# Patient Record
Sex: MALE | Race: WHITE | Employment: OTHER | URBAN - METROPOLITAN AREA
[De-identification: names, ages, dates, MRNs, and addresses within clinical notes are randomized per-mention and may not be internally consistent; named-entity substitution may affect disease eponyms.]

---

## 2018-08-25 ENCOUNTER — HOSPITAL ENCOUNTER (INPATIENT)
Age: 83
LOS: 3 days | Discharge: HOME | DRG: 244 | End: 2018-08-28
Attending: EMERGENCY MEDICINE | Admitting: INTERNAL MEDICINE
Payer: MEDICARE

## 2018-08-25 DIAGNOSIS — R00.1 SYMPTOMATIC BRADYCARDIA: Primary | ICD-10-CM

## 2018-08-25 DIAGNOSIS — R55 SYNCOPE AND COLLAPSE: ICD-10-CM

## 2018-08-25 PROBLEM — I45.10 RIGHT BUNDLE BRANCH BLOCK: Status: ACTIVE | Noted: 2018-08-25

## 2018-08-25 PROBLEM — R03.0 ELEVATED BLOOD PRESSURE READING WITHOUT DIAGNOSIS OF HYPERTENSION: Status: ACTIVE | Noted: 2018-08-25

## 2018-08-25 LAB
ALBUMIN SERPL-MCNC: 3.7 G/DL (ref 3.5–5)
ALBUMIN/GLOB SERPL: 1.1 {RATIO} (ref 1.1–2.2)
ALP SERPL-CCNC: 59 U/L (ref 45–117)
ALT SERPL-CCNC: 21 U/L (ref 12–78)
ANION GAP SERPL CALC-SCNC: 9 MMOL/L (ref 5–15)
APPEARANCE UR: CLEAR
AST SERPL-CCNC: 22 U/L (ref 15–37)
BACTERIA URNS QL MICRO: NEGATIVE /HPF
BASOPHILS # BLD: 0.1 K/UL (ref 0–0.1)
BASOPHILS NFR BLD: 1 % (ref 0–1)
BILIRUB SERPL-MCNC: 1.3 MG/DL (ref 0.2–1)
BILIRUB UR QL: NEGATIVE
BUN SERPL-MCNC: 15 MG/DL (ref 6–20)
BUN/CREAT SERPL: 13 (ref 12–20)
CALCIUM SERPL-MCNC: 8.6 MG/DL (ref 8.5–10.1)
CHLORIDE SERPL-SCNC: 104 MMOL/L (ref 97–108)
CO2 SERPL-SCNC: 28 MMOL/L (ref 21–32)
COLOR UR: NORMAL
COMMENT, HOLDF: NORMAL
CREAT SERPL-MCNC: 1.14 MG/DL (ref 0.7–1.3)
DIFFERENTIAL METHOD BLD: ABNORMAL
EOSINOPHIL # BLD: 0.1 K/UL (ref 0–0.4)
EOSINOPHIL NFR BLD: 1 % (ref 0–7)
EPITH CASTS URNS QL MICRO: NORMAL /LPF
ERYTHROCYTE [DISTWIDTH] IN BLOOD BY AUTOMATED COUNT: 12.9 % (ref 11.5–14.5)
GLOBULIN SER CALC-MCNC: 3.5 G/DL (ref 2–4)
GLUCOSE SERPL-MCNC: 145 MG/DL (ref 65–100)
GLUCOSE UR STRIP.AUTO-MCNC: NEGATIVE MG/DL
HCT VFR BLD AUTO: 40.1 % (ref 36.6–50.3)
HGB BLD-MCNC: 13.4 G/DL (ref 12.1–17)
HGB UR QL STRIP: NEGATIVE
HYALINE CASTS URNS QL MICRO: NORMAL /LPF (ref 0–5)
IMM GRANULOCYTES # BLD: 0 K/UL (ref 0–0.04)
IMM GRANULOCYTES NFR BLD AUTO: 0 % (ref 0–0.5)
KETONES UR QL STRIP.AUTO: NEGATIVE MG/DL
LEUKOCYTE ESTERASE UR QL STRIP.AUTO: NEGATIVE
LYMPHOCYTES # BLD: 1.1 K/UL (ref 0.8–3.5)
LYMPHOCYTES NFR BLD: 13 % (ref 12–49)
MAGNESIUM SERPL-MCNC: 2.2 MG/DL (ref 1.6–2.4)
MCH RBC QN AUTO: 32.1 PG (ref 26–34)
MCHC RBC AUTO-ENTMCNC: 33.4 G/DL (ref 30–36.5)
MCV RBC AUTO: 95.9 FL (ref 80–99)
MONOCYTES # BLD: 0.5 K/UL (ref 0–1)
MONOCYTES NFR BLD: 6 % (ref 5–13)
NEUTS SEG # BLD: 6.4 K/UL (ref 1.8–8)
NEUTS SEG NFR BLD: 79 % (ref 32–75)
NITRITE UR QL STRIP.AUTO: NEGATIVE
NRBC # BLD: 0 K/UL (ref 0–0.01)
NRBC BLD-RTO: 0 PER 100 WBC
PH UR STRIP: 6.5 [PH] (ref 5–8)
PLATELET # BLD AUTO: 209 K/UL (ref 150–400)
PMV BLD AUTO: 9.7 FL (ref 8.9–12.9)
POTASSIUM SERPL-SCNC: 4 MMOL/L (ref 3.5–5.1)
PROT SERPL-MCNC: 7.2 G/DL (ref 6.4–8.2)
PROT UR STRIP-MCNC: NEGATIVE MG/DL
RBC # BLD AUTO: 4.18 M/UL (ref 4.1–5.7)
RBC #/AREA URNS HPF: NORMAL /HPF (ref 0–5)
SAMPLES BEING HELD,HOLD: NORMAL
SODIUM SERPL-SCNC: 141 MMOL/L (ref 136–145)
SP GR UR REFRACTOMETRY: 1.01 (ref 1–1.03)
TROPONIN I SERPL-MCNC: <0.05 NG/ML
TSH SERPL DL<=0.05 MIU/L-ACNC: 4.35 UIU/ML (ref 0.36–3.74)
UR CULT HOLD, URHOLD: NORMAL
UROBILINOGEN UR QL STRIP.AUTO: 0.2 EU/DL (ref 0.2–1)
WBC # BLD AUTO: 8.1 K/UL (ref 4.1–11.1)
WBC URNS QL MICRO: NORMAL /HPF (ref 0–4)

## 2018-08-25 PROCEDURE — 99285 EMERGENCY DEPT VISIT HI MDM: CPT

## 2018-08-25 PROCEDURE — 36415 COLL VENOUS BLD VENIPUNCTURE: CPT | Performed by: EMERGENCY MEDICINE

## 2018-08-25 PROCEDURE — 93005 ELECTROCARDIOGRAM TRACING: CPT

## 2018-08-25 PROCEDURE — 83735 ASSAY OF MAGNESIUM: CPT | Performed by: EMERGENCY MEDICINE

## 2018-08-25 PROCEDURE — 81001 URINALYSIS AUTO W/SCOPE: CPT | Performed by: EMERGENCY MEDICINE

## 2018-08-25 PROCEDURE — 84443 ASSAY THYROID STIM HORMONE: CPT | Performed by: INTERNAL MEDICINE

## 2018-08-25 PROCEDURE — 84484 ASSAY OF TROPONIN QUANT: CPT | Performed by: EMERGENCY MEDICINE

## 2018-08-25 PROCEDURE — 74011000250 HC RX REV CODE- 250: Performed by: INTERNAL MEDICINE

## 2018-08-25 PROCEDURE — 80053 COMPREHEN METABOLIC PANEL: CPT | Performed by: EMERGENCY MEDICINE

## 2018-08-25 PROCEDURE — 65610000006 HC RM INTENSIVE CARE

## 2018-08-25 PROCEDURE — 77030018729 HC ELECTRD DEFIB PAD CARD -B

## 2018-08-25 PROCEDURE — 85025 COMPLETE CBC W/AUTO DIFF WBC: CPT | Performed by: EMERGENCY MEDICINE

## 2018-08-25 PROCEDURE — 74011250636 HC RX REV CODE- 250/636: Performed by: INTERNAL MEDICINE

## 2018-08-25 RX ORDER — LATANOPROST 50 UG/ML
1 SOLUTION/ DROPS OPHTHALMIC
COMMUNITY

## 2018-08-25 RX ORDER — SODIUM CHLORIDE 0.9 % (FLUSH) 0.9 %
5-10 SYRINGE (ML) INJECTION AS NEEDED
Status: DISCONTINUED | OUTPATIENT
Start: 2018-08-25 | End: 2018-08-28 | Stop reason: HOSPADM

## 2018-08-25 RX ORDER — HYDRALAZINE HYDROCHLORIDE 20 MG/ML
10 INJECTION INTRAMUSCULAR; INTRAVENOUS
Status: DISCONTINUED | OUTPATIENT
Start: 2018-08-25 | End: 2018-08-26

## 2018-08-25 RX ORDER — SODIUM CHLORIDE 0.9 % (FLUSH) 0.9 %
5-10 SYRINGE (ML) INJECTION EVERY 8 HOURS
Status: DISCONTINUED | OUTPATIENT
Start: 2018-08-25 | End: 2018-08-28 | Stop reason: HOSPADM

## 2018-08-25 RX ORDER — LATANOPROST 50 UG/ML
1 SOLUTION/ DROPS OPHTHALMIC
Status: DISCONTINUED | OUTPATIENT
Start: 2018-08-25 | End: 2018-08-28 | Stop reason: HOSPADM

## 2018-08-25 RX ORDER — ACETAMINOPHEN 325 MG/1
650 TABLET ORAL
Status: DISCONTINUED | OUTPATIENT
Start: 2018-08-25 | End: 2018-08-28 | Stop reason: HOSPADM

## 2018-08-25 RX ORDER — ENOXAPARIN SODIUM 100 MG/ML
40 INJECTION SUBCUTANEOUS EVERY 24 HOURS
Status: DISCONTINUED | OUTPATIENT
Start: 2018-08-25 | End: 2018-08-28 | Stop reason: HOSPADM

## 2018-08-25 RX ORDER — ATROPINE SULFATE 0.1 MG/ML
1 INJECTION INTRAVENOUS AS NEEDED
Status: DISCONTINUED | OUTPATIENT
Start: 2018-08-25 | End: 2018-08-28 | Stop reason: HOSPADM

## 2018-08-25 RX ADMIN — Medication 10 ML: at 22:00

## 2018-08-25 RX ADMIN — ENOXAPARIN SODIUM 40 MG: 40 INJECTION, SOLUTION INTRAVENOUS; SUBCUTANEOUS at 16:20

## 2018-08-25 RX ADMIN — LATANOPROST 1 DROP: 50 SOLUTION OPHTHALMIC at 22:11

## 2018-08-25 NOTE — ED NOTES
TRANSFER - OUT REPORT:    Verbal report given to Amy  on Josh Alvarez  being transferred to [phone] for routine progression of care       Report consisted of patients Situation, Background, Assessment and   Recommendations(SBAR). Information from the following report(s) SBAR, ED Summary, [facility] and Recent Results was reviewed with the receiving nurse. Opportunity for questions and clarification was provided.

## 2018-08-25 NOTE — IP AVS SNAPSHOT
[address] 
[phone] Patient: Teri Vargas MRN: [id number] :1929 A check orion indicates which time of day the medication should be taken. My Medications START taking these medications Instructions Each Dose to Equal  
 Morning Noon Evening Bedtime  
 cephALEXin 500 mg capsule Commonly known as:  Daja Officer Your last dose was: Your next dose is: Take 1 Cap by mouth three (3) times daily for 5 days. 500 mg CONTINUE taking these medications Instructions Each Dose to Equal  
 Morning Noon Evening Bedtime  
 latanoprost 0.005 % ophthalmic solution Commonly known as:  Verhanna Saavedra Your last dose was: Your next dose is:    
   
   
 Administer 1 Drop to both eyes nightly. 1 Drop Where to Get Your Medications These medications were sent to [facility], [address] [place] [address], [address] [place] [address] Phone:  [phone]  
  cephALEXin 500 mg capsule

## 2018-08-25 NOTE — H&P
[facility]    Hospitalist Admission Note      NAME:  Alex Adame   :   1929   MRN:  [id number]     PCP:  None     Date/Time:  2018 3:08 PM          Subjective:     CHIEF COMPLAINT: Syncope     HISTORY OF PRESENT ILLNESS:     Mr. Gamal Dobbs is a 80 y.o.  male with no active medical issues who presented to the Emergency Department complaining of syncope. Patient was in usual state of health this AM, mowed lawn with push mower and came inside for lunch. While eating lunch, patient had syncope/LOC with near immediate regain of consciousness. Wife witnessed, noted that he flailed and didn't know what happened. 2 more episodes occurred since 1 pm. Brought in by EMS and noted to have wildly variable HR and patient having dizziness. In ED, patient had several missed beats with near LOC again. We will admit in concert with our cardiology colleagues.     Past Medical Hx:  - Denies any current medical conditions    Past Surgical Hx:  - Appendectomy at 25 yo    Family Hx:  - No hx of early cardiovascular disease    Social Hx:  - Doesn't drink or smoke      No Known Allergies     Prior to Admission medications    Not on File   - Take tumeric, garlic, D3, fish oil    Review of Systems:  (bold if positive, if negative)    Gen:  Eyes:  ENT:  CVS:  syncopePulm:  GI:    :    MS:  Skin:  Psych:  Endo:    Hem:  Renal:    Neuro:        Objective:      VITALS:    Vital signs reviewed; most recent are:    Visit Vitals    /52 (BP 1 Location: Left arm, BP Patient Position: At rest)    Pulse 97    Temp 97.9 °F (36.6 °C)    Resp 11    Ht 5' 8.5\" (1.74 m)    Wt 68 kg (150 lb)    SpO2 98%    BMI 22.48 kg/m2     SpO2 Readings from Last 6 Encounters:   18 98%        No intake or output data in the 24 hours ending 18 1508     Exam:     Physical Exam:    Gen:  Well-developed, well-nourished, in no acute distress  HEENT:  Pink conjunctivae, PERRL, hearing intact to voice, moist mucous membranes  Neck:  Supple, without masses, thyroid non-tender  Resp:  No accessory muscle use, clear breath sounds without wheezes rales or rhonchi  Card:  Irregularly, irregular No murmurs, normal S1, S2 without thrills, bruits or peripheral edema  Abd:  Soft, non-tender, non-distended, normoactive bowel sounds are present, no palpable organomegaly and no detectable hernias  Lymph:  No cervical or inguinal adenopathy  Musc:  No cyanosis or clubbing  Skin:  No rashes or ulcers, skin turgor is good  Neuro:  Cranial nerves are grossly intact, no focal motor weakness, follows commands appropriately  Psych:  Good insight, oriented to person, place and time, alert     Labs:    Recent Labs      08/25/18   1418   WBC  8.1   HGB  13.4   HCT  40.1   PLT  209     Recent Labs      08/25/18   1418   NA  141   K  4.0   CL  104   CO2  28   GLU  145*   BUN  15   CREA  1.14   CA  8.6   MG  2.2   ALB  3.7   TBILI  1.3*   SGOT  22   ALT  21     No results found for: GLUCPOC  No results for input(s): PH, PCO2, PO2, HCO3, FIO2 in the last 72 hours. No results for input(s): INR in the last 72 hours. No lab exists for component: INREXT  All Micro Results     Procedure Component Value Units Date/Time    URINE CULTURE HOLD SAMPLE [[id number]] Collected:  08/25/18 1418    Order Status:  Completed Specimen:  Urine from Serum Updated:  08/25/18 1428     Urine culture hold         URINE ON HOLD IN MICROBIOLOGY DEPT FOR 3 DAYS. IF UNPRESERVED URINE IS SUBMITTED, IT CANNOT BE USED FOR ADDITIONAL TESTING AFTER 24 HRS, RECOLLECTION WILL BE REQUIRED. ECG and telemetry strips reviewed    I have reviewed previous records       Assessment and Plan: Active Problems:  Symptomatic bradycardia. 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 Admit to ICU for close monitoring. Transcutaneous pacer pads in place. PRN atropine.  Cardiology consult. Likely needs PPM. Check echo. Cycle enzymes. Elevated blood pressure reading without diagnosis of hypertension. Wide pulse pressure. No chest pain or SOB. PRN hydralazine. Avoid AV janki blocking agents. Telemetry reviewed:   Irregular rhythm, appears sinus at times, tachy-emmanuel? Mobitz 2?     Risk of deterioration: high      Total CRITICAL CARE time spent with patient: [phone] [facility] discussed with: Patient, Family, Nursing Staff and >50% of time spent in counseling and coordination of care    Discussed:  Code Status, Care Plan and D/C Planning       ___________________________________________________    Attending Physician: Julius Cranker, DO

## 2018-08-25 NOTE — ED PROVIDER NOTES
HPI Comments: 80 y.o. male with no significant past medical history who presents from home with chief complaint of syncope. Pt reports he had a syncopal episode which lasted several seconds while eating lunch after mowing grass and showering 1-2 hours ago. Per Pt's wife, Pt's \"arms were flailing around\" at that time. Pt states he had a 2nd episode which lasted several seconds after sitting on his couch. Pt's wife noted Pt had 3 syncopal episodes total. Pt now c/o lightheadedness. Pt states he does not go to the doctor regularly and has never been on regular medications. NKDA. When asked about hx of surgery, Pt notes he had an appendectomy at 24 y.o. Pt denies prior hx of syncope, DM, HTN, or high cholesterol. Pt denies pain, vomiting, and diarrhea. There are no other acute medical concerns at this time. Old Chart Review: No prior visits. Social hx: denies smoking and EtOH use    Note written by Deangelo Roy, as dictated by Boogie Horowitz DO 2:38 PM    The history is provided by the patient. No past medical history on file. No past surgical history on file. No family history on file. Social History     Social History    Marital status: N/A     Spouse name: N/A    Number of children: N/A    Years of education: N/A     Occupational History    Not on file. Social History Main Topics    Smoking status: Not on file    Smokeless tobacco: Not on file    Alcohol use Not on file    Drug use: Not on file    Sexual activity: Not on file     Other Topics Concern    Not on file     Social History Narrative         ALLERGIES: Review of patient's allergies indicates no known allergies. Review of Systems   Cardiovascular: Negative for chest pain. Gastrointestinal: Negative for abdominal pain, diarrhea and vomiting. Musculoskeletal: Negative for back pain. Neurological: Positive for syncope and light-headedness. Negative for headaches.    All other systems reviewed and are negative. Vitals:    08/25/18 1352   BP: 165/52   Pulse: 97   Resp: 11   Temp: 97.9 °F (36.6 °C)   SpO2: 98%   Weight: 68 kg (150 lb)   Height: 5' 8.5\" (1.74 m)            Physical Exam      Constitutional: Pt is awake and alert. Pt appears well-developed and well-nourished. NAD. HENT:   Head: Normocephalic and atraumatic. Nose: Nose normal.   Mouth/Throat: Oropharynx is clear and moist. No oropharyngeal exudate. Eyes: Conjunctivae and extraocular motions are normal. Pupils are equal, round, and reactive to light. Right eye exhibits no discharge. Left eye exhibits no discharge. No scleral icterus. Neck: No tracheal deviation present. Supple neck. Cardiovascular: Normal rate, regular rhythm, normal heart sounds and intact distal pulses. Exam reveals no gallop and no friction rub. No murmur heard. Pulmonary/Chest: Effort normal and breath sounds normal.  Pt  has no wheezes. Pt  has no rales. Abdominal: Soft. Pt  exhibits no distension and no mass. No tenderness. Pt  has no rebound and no guarding. Musculoskeletal:  Pt  exhibits no edema and no tenderness. Ext: Normal ROM in all four extremities; not tender to palpation; distal pulses are normal, no edema. Neurological:  Pt is alert. nonfocal neuro exam.  Skin: Skin is warm and dry. Pt  is not diaphoretic. Psychiatric:  Pt  has a normal mood and affect. Behavior is normal.   Note written by Deangelo Mosley, as dictated by Shauna Salamanca DO 2:38 PM    MDM  Number of Diagnoses or Management Options  Critical Care  Total time providing critical care: 30-74 minutes      30 minutes      ED Course       Procedures    ED EKG interpretation:  Rhythm: sinus tachycardia w/ PAC's; Rate (approx.): 102;  Axis: normal; ST/T wave: no acute ST/T wave changes noted; RBBB  Note written by Deangelo Mosley, as dictated by Shauna Salamanca DO 2:02 PM    PROGRESS NOTE:  2:38 PM  Pt's heart monitor showed HR changing back and forth from sinus bradycardia and NSR (rate 50's-100's) w/ multiple dropped QRS complexes w/ visible P waves and PVC's. PROGRESS NOTE:  2:41 PM  Heart monitor stripes were printed and scanned into Pt's chart. CONSULT NOTE:  2:47 PM Isidro Gaines DO spoke with Dr. Cedrick Dwyer, Consult for Cardiology. Discussed available diagnostic tests and clinical findings. Dr. Cedrikc Dwyer recommends admission via hospitalist service and he will follow as a consultant. CONSULT NOTE:  2:49 PM Isidro Gaines DO spoke with Dr. Les Camejo, Consult for Hospitalist.  Discussed available diagnostic tests and clinical findings. Dr. Les Camejo will admit Pt. While interviewing pt, he had several pauses and episodes of mild bradycardia  Ordered prn atropine and attached pt to external pacer  Admit hospitalist  Cards on board  Takes no regular meds  D/w pt and wife. Recent Results (from the past 12 hour(s))   EKG, 12 LEAD, INITIAL    Collection Time: 08/25/18  1:54 PM   Result Value Ref Range    Ventricular Rate 102 BPM    Atrial Rate 102 BPM    P-R Interval 158 ms    QRS Duration 132 ms    Q-T Interval 390 ms    QTC Calculation (Bezet) 508 ms    Calculated P Axis 74 degrees    Calculated R Axis 67 degrees    Calculated T Axis 61 degrees    Diagnosis       Sinus tachycardia with premature atrial complexes  Right bundle branch block  Abnormal ECG  No previous ECGs available     SAMPLES BEING HELD    Collection Time: 08/25/18  2:18 PM   Result Value Ref Range    SAMPLES BEING HELD RED,BLUE,SST     COMMENT        Add-on orders for these samples will be processed based on acceptable specimen integrity and analyte stability, which may vary by analyte.    MAGNESIUM    Collection Time: 08/25/18  2:18 PM   Result Value Ref Range    Magnesium 2.2 1.6 - 2.4 mg/dL   CBC WITH AUTOMATED DIFF    Collection Time: 08/25/18  2:18 PM   Result Value Ref Range    WBC 8.1 4.1 - 11.1 K/uL    RBC 4.18 4.10 - 5.70 M/uL    HGB 13.4 12.1 - 17.0 g/dL    HCT 40.1 36.6 - 50.3 %    MCV 95.9 80.0 - 99.0 FL    MCH 32.1 26.0 - 34.0 PG    MCHC 33.4 30.0 - 36.5 g/dL    RDW 12.9 11.5 - 14.5 %    PLATELET 936 458 - 176 K/uL    MPV 9.7 8.9 - 12.9 FL    NRBC 0.0 0  WBC    ABSOLUTE NRBC 0.00 0.00 - 0.01 K/uL    NEUTROPHILS 79 (H) 32 - 75 %    LYMPHOCYTES 13 12 - 49 %    MONOCYTES 6 5 - 13 %    EOSINOPHILS 1 0 - 7 %    BASOPHILS 1 0 - 1 %    IMMATURE GRANULOCYTES 0 0.0 - 0.5 %    ABS. NEUTROPHILS 6.4 1.8 - 8.0 K/UL    ABS. LYMPHOCYTES 1.1 0.8 - 3.5 K/UL    ABS. MONOCYTES 0.5 0.0 - 1.0 K/UL    ABS. EOSINOPHILS 0.1 0.0 - 0.4 K/UL    ABS. BASOPHILS 0.1 0.0 - 0.1 K/UL    ABS. IMM. GRANS. 0.0 0.00 - 0.04 K/UL    DF AUTOMATED     METABOLIC PANEL, COMPREHENSIVE    Collection Time: 08/25/18  2:18 PM   Result Value Ref Range    Sodium 141 136 - 145 mmol/L    Potassium 4.0 3.5 - 5.1 mmol/L    Chloride 104 97 - 108 mmol/L    CO2 28 21 - 32 mmol/L    Anion gap 9 5 - 15 mmol/L    Glucose 145 (H) 65 - 100 mg/dL    BUN 15 6 - 20 MG/DL    Creatinine 1.14 0.70 - 1.30 MG/DL    BUN/Creatinine ratio 13 12 - 20      GFR est AA >60 >60 ml/min/1.73m2    GFR est non-AA >60 >60 ml/min/1.73m2    Calcium 8.6 8.5 - 10.1 MG/DL    Bilirubin, total 1.3 (H) 0.2 - 1.0 MG/DL    ALT (SGPT) 21 12 - 78 U/L    AST (SGOT) 22 15 - 37 U/L    Alk.  phosphatase 59 45 - 117 U/L    Protein, total 7.2 6.4 - 8.2 g/dL    Albumin 3.7 3.5 - 5.0 g/dL    Globulin 3.5 2.0 - 4.0 g/dL    A-G Ratio 1.1 1.1 - 2.2     TROPONIN I    Collection Time: 08/25/18  2:18 PM   Result Value Ref Range    Troponin-I, Qt. <0.05 <0.05 ng/mL   URINALYSIS W/MICROSCOPIC    Collection Time: 08/25/18  2:18 PM   Result Value Ref Range    Color YELLOW/STRAW      Appearance CLEAR CLEAR      Specific gravity 1.012 1.003 - 1.030      pH (UA) 6.5 5.0 - 8.0      Protein NEGATIVE  NEG mg/dL    Glucose NEGATIVE  NEG mg/dL    Ketone NEGATIVE  NEG mg/dL    Bilirubin NEGATIVE  NEG      Blood NEGATIVE  NEG      Urobilinogen 0.2 0.2 - 1.0 EU/dL    Nitrites NEGATIVE  NEG Leukocyte Esterase NEGATIVE  NEG      WBC 0-4 0 - 4 /hpf    RBC 0-5 0 - 5 /hpf    Epithelial cells FEW FEW /lpf    Bacteria NEGATIVE  NEG /hpf    Hyaline cast 0-2 0 - 5 /lpf   URINE CULTURE HOLD SAMPLE    Collection Time: 08/25/18  2:18 PM   Result Value Ref Range    Urine culture hold        URINE ON HOLD IN MICROBIOLOGY DEPT FOR 3 DAYS. IF UNPRESERVED URINE IS SUBMITTED, IT CANNOT BE USED FOR ADDITIONAL TESTING AFTER 24 HRS, RECOLLECTION WILL BE REQUIRED.    TSH 3RD GENERATION    Collection Time: 08/25/18  2:18 PM   Result Value Ref Range    TSH 4.35 (H) 0.36 - 3.74 uIU/mL

## 2018-08-25 NOTE — IP AVS SNAPSHOT
[address] [facility] [address] 
 
 
 [address] [facility] 
[phone] Patient: Tim Fuller MRN: [id number] :[id number] About your hospitalization You were admitted on:  2018 You last received care in the:  [facility] 3 INTENSIVE CARE You were discharged on:  2018 Why you were hospitalized Your primary diagnosis was:  Syncope And Collapse Your diagnoses also included:  Symptomatic Bradycardia, Elevated Blood Pressure Reading Without Diagnosis Of Hypertension, Right Bundle Branch Block Follow-up Information Follow up With Details Comments Contact Info Lisa Barclay MD On 2018 Hospital PCP f/u appointment on  @ 1:00 p.m. [address] [facility] [address] [place] [address] 
[phone] None   None (395) Patient stated that they have no PCP Discharge Orders None A check orion indicates which time of day the medication should be taken. My Medications START taking these medications Instructions Each Dose to Equal  
 Morning Noon Evening Bedtime  
 cephALEXin 500 mg capsule Commonly known as:  Timmy Corrales Your last dose was: Your next dose is: Take 1 Cap by mouth three (3) times daily for 5 days. 500 mg CONTINUE taking these medications Instructions Each Dose to Equal  
 Morning Noon Evening Bedtime  
 latanoprost 0.005 % ophthalmic solution Commonly known as:  Quan Rivas Your last dose was: Your next dose is:    
   
   
 Administer 1 Drop to both eyes nightly. 1 Drop Where to Get Your Medications These medications were sent to [facility], [address] [place] [address], [facility] Bro Siddiqui,  [address] [place] [address] Phone:  [phone]  
  cephALEXin 500 mg capsule Discharge Instructions ACUTE DIAGNOSES: 
 Symptomatic bradycardia CHRONIC MEDICAL DIAGNOSES: 
Problem List as of 8/28/2018  Date Reviewed: 8/28/2018 Codes Class Noted - Resolved Symptomatic bradycardia ICD-10-CM: R00.1 ICD-9-CM: 427.89  8/25/2018 - Present Elevated blood pressure reading without diagnosis of hypertension ICD-10-CM: R03.0 ICD-9-CM: 796.2  8/25/2018 - Present Right bundle branch block ICD-10-CM: I45.10 ICD-9-CM: 426.4  8/25/2018 - Present * (Principal)RESOLVED: Syncope and collapse ICD-10-CM: R55 
ICD-9-CM: 780.2  8/25/2018 - 8/28/2018 DISCHARGE MEDICATIONS:  
  
 
 
· It is important that you take the medication exactly as they are prescribed. · Keep your medication in the bottles provided by the pharmacist and keep a list of the medication names, dosages, and times to be taken in your wallet. · Do not take other medications without consulting your doctor. DIET:  Cardiac Diet ACTIVITY: Activity as tolerated ADDITIONAL INFORMATION: If you experience any of the following symptoms then please call your primary care physician or return to the emergency room if you cannot get hold of your doctor: Fever, chills, nausea, vomiting, diarrhea, change in mentation, falling, bleeding, shortness of breath. FOLLOW UP CARE: 
Primary care physician. you are to call and set up an appointment to see them in 2 weeks. Follow-up with cardiology Information obtained by : 
I understand that if any problems occur once I am at home I am to contact my physician. I understand and acknowledge receipt of the instructions indicated above. Physician's or R.N.'s Signature                                                                  Date/Time Patient or Representative Signature                                                          Date/Time Pacemaker Discharge Instructions Please make sure you have received your Temporary Pacemaker identification card with your discharge instructions MEDICATIONS ? Take only the medications prescribed to you at discharge. ACTIVITY ? Return to your normal activity, except as noted below. o Do not lift anything heavier than 10 pounds for 4 weeks with the affected arm. This is how long it takes the muscles to heal, and the leads inside your heart to stabilize their position. o Do not reach above your head with the affected arm for 4 weeks, doing so increases the risk of lead dislodgement. o It is, however, important to move the affected arm to prevent shoulder stiffness and locking. o Avoid tight clothes or unnecessary pressure over your incision (such as bra straps or seat belts). If it is tender or sensitive to clothing, cover the incision with a soft dressing or pad. 
o Questions about driving are individualized and should be discussed with one of the EP Physicians prior to discharge. SHOWERING  
  
 
? Leave the bandage over your incision for 10 days after the Pacemaker implant. You bandage will be removed in clinic in 10 days. ? It is important to keep the bandaged area clean and dry. You may shower around the site until the bandage is removed in clinic. Thereafter, you may shower after the bandage is removed, washing it gently with soap and water. Do not apply any lotions, powders, or perfumes to the incision line. ? Avoid submerging your incision in water (tub baths, hot tubs, or swimming) for four weeks. ? Underneath the dressing.  
o If you have white steri-strips over your incision (underneath the gauze dressing), they will curl up at the end and fall off, usually within 10 days.  Do not pull them off. 
- OR -  
o You may have a different type of closure for the incision. If Dermabond Adhesive was used to close your incision, you will receive a separate instruction sheet. DISCHARGE PRECAUTIONS ? Record your temperature every day, at the same time, for 3 weeks after your implant. A temperature of 100.5 F, or higher, can be the first sign of infection. This should be reported to your Doctor immediately. ? You can have an MRI after 6 weeks. You must be aware that any strong magnet or magnetic field can affect your Pacemaker. In general, be careful of metal detectors, heavy machinery, and any area where arc-welding is performed. Avoid metal detectors such as the ones in security checkpoints at [facility] or [address] [place] [address]. When approaching a security checkpoint show your Pacemaker ID Card to security personnel and ask to be hand searched. ? Always tell your doctor or dentist that you have a Pacemaker. Antibiotics may be prescribed before certain procedures. ? If you use a cell phone, hold it on the opposite side from where your Pacemaker is implanted. ? Your temporary identification will be given to you with these instructions. Keep your Pacemaker card in your wallet or on your person at all times. You should receive your permanent card in 8 weeks. If you do not receive your permanent card, please call the office at [phone]. TAKING YOUR PULSE ? Take your pulse the same time every day, preferably in the morning. ? Sit down and rest for 5 minutes prior to taking your pulse. ? Take your pulse for 1 full minute, use a clock or stop watch with a second hand. ? To feel your pulse, use the first two fingers of one hand; place them on the thumb side of the wrist of the opposite hand. The pulse will be steady, regular and throbbing. ? Call the EP Lab Doctors if your pulse is less than 40 beats per minute. SYMPTOMS THAT NEED TO BE REPORTED IMMEDIATELY ? Temperature more than 100.4 F ? Redness or warmth at the incision site, or pain for longer than the first 5 days after the implant. ? Drainage from the incision site. ? Swelling around the incision site. ? Shortness of breath. ? Rapid heart rate or palpitations. ? Dizziness, lightheadedness, fainting. ? Slow pulse below 40 beats per minute. ? REMEMBER: If you feel something is an emergency or cannot be handled over the phone, call 911 or go to the closest emergency room. Dakota Ahmadi MD 
Cardiac Electrophysiology / Cardiology [address] [address] 
[address] [facility], [address] [facility], [address] [place], [address] 
[phone] / [phone] Fax       [phone] / [phone] Fax MyChart Announcement We are excited to announce that we are making your provider's discharge notes available to you in [facility]. You will see these notes when they are completed and signed by the physician that discharged you from your recent hospital stay. If you have any questions or concerns about any information you see in [facility], please call the Health Information Department where you were seen or reach out to your Primary Care Provider for more information about your plan of care. Introducing hospitals & HEALTH SERVICES! [employer] introduces [facility] patient portal. Now you can access parts of your medical record, email your doctor's office, and request medication refills online. 1. In your internet browser, go to [email or web address] 2. Click on the First Time User? Click Here link in the Sign In box. You will see the New Member Sign Up page. 3. Enter your [facility] Access Code exactly as it appears below.  You will not need to use this code after youve completed the sign-up process. If you do not sign up before the expiration date, you must request a new code. · Survature Access Code: [email or web address] Expires: 11/23/2018  1:58 PM 
 
4. Enter the last four digits of your Social Security Number (xxxx) and Date of Birth (mm/dd/yyyy) as indicated and click Submit. You will be taken to the next sign-up page. 5. Create a Survature ID. This will be your Survature login ID and cannot be changed, so think of one that is secure and easy to remember. 6. Create a Survature password. You can change your password at any time. 7. Enter your Password Reset Question and Answer. This can be used at a later time if you forget your password. 8. Enter your e-mail address. You will receive e-mail notification when new information is available in [address] 9. Click Sign Up. You can now view and download portions of your medical record. 10. Click the Download Summary menu link to download a portable copy of your medical information. If you have questions, please visit the Frequently Asked Questions section of the Survature website. Remember, Survature is NOT to be used for urgent needs. For medical emergencies, dial 911. Now available from your iPhone and Android! Introducing Db Fajardo As a [facility] patient, I wanted to make you aware of our electronic visit tool called Db Scotty. [facility] 24/7 allows you to connect within minutes with a medical provider 24 hours a day, seven days a week via a mobile device or tablet or logging into a secure website from your computer. You can access Db Fajardo from anywhere in the [place].  
 
A virtual visit might be right for you when you have a simple condition and feel like you just dont want to get out of bed, or cant get away from work for an appointment, when your regular [facility] provider is not available (evenings, weekends or holidays), or when youre out of town and need minor care. Electronic visits cost only $49 and if the [facility] Olds 24/7 provider determines a prescription is needed to treat your condition, one can be electronically transmitted to a nearby pharmacy*. Please take a moment to enroll today if you have not already done so. The enrollment process is free and takes just a few minutes. To enroll, please download the [facility] 24/7 chris to your tablet or phone, or visit [email or web address] to enroll on your computer. And, as an [address] [place] [address] patient with a Blue Box account, the results of your visits will be scanned into your electronic medical record and your primary care provider will be able to view the scanned results. We urge you to continue to see your regular Riya Anand provider for your ongoing medical care. And while your primary care provider may not be the one available when you seek a Avitidebrifin virtual visit, the peace of mind you get from getting a real diagnosis real time can be priceless. For more information on Filtec, view our Frequently Asked Questions (FAQs) at [email or web address]. Sincerely, 
 
Jaswant Mayes MD 
Chief Medical Officer [employer] *:  certain medications cannot be prescribed via Filtec Unresulted Labs-Please follow up with your PCP about these lab tests Order Current Status EKG, 12 LEAD, INITIAL Preliminary result EKG, 12 LEAD, INITIAL Preliminary result Providers Seen During Your Hospitalization Provider Specialty Primary office phone Adan Sherman DO Emergency Medicine [phone] Moises Chiang DO Internal Medicine [phone] Urban Eaton MD Hospitalist [phone] Your Primary Care Physician (PCP) Primary Care Physician Office Phone Office Fax  NONE ** None ** ** None **  
  
 You are allergic to the following No active allergies Recent Documentation Height Weight BMI  
  
  
 1.74 m 62.8 kg 20.74 kg/m2 Patient Belongings The following personal items are in your possession at time of discharge: 
  Dental Appliances: None  Visual Aid: Glasses   Hearing Aids/Status: Left  Home Medications: None   Jewelry: None  Clothing: None    Other Valuables: None Discharge Instructions Attachments/References BRADYCARDIA PACEMAKER: PRE-OP (ENGLISH) HEART: PACEMAKER: ANATOMY SKETCH (ENGLISH) CARDIAC ARRHYTHMIA (ENGLISH) Patient Handouts Pacemaker Placement: Before Your Procedure What is pacemaker placement? A pacemaker is a small, battery-powered device. It sends electrical signals to the heart. This keeps the heartbeat steady when you have bradycardia (a slow heart rate). Thin wires, called leads, carry the signals between the pacemaker and the heart. This device is also called a pacer. You will get medicine before the procedure. This helps you relax and helps prevent pain. The doctor will make a cut in the skin just below your collarbone. The cut may be on either side of your chest. The doctor will put the pacemaker leads through the cut. The leads go into a large blood vessel in the upper chest. Then the doctor will guide the leads through the blood vessel into the heart. The doctor will place the pacemaker under the skin of your chest. He or she will attach the leads to the pacemaker. Then the cut will be closed with stitches. The procedure usually takes about an hour. You may need to spend the night in the hospital. 
Pacemaker batteries usually last 5 to 15 years. Your doctor will talk to you about how often you will need to have your pacemaker and battery checked. You can likely return to many of your normal activities after your procedure. But to stay safe, you may need to make some changes in your normal routine. You may feel worried about having a pacemaker. This is common. You might feel better if you learn techniques to help you relax. And it can help if you learn about how the pacemaker helps your heart. Talk to your doctor about your questions and concerns. Follow-up care is a key part of your treatment and safety. Be sure to make and go to all appointments, and call your doctor if you are having problems. It's also a good idea to know your test results and keep a list of the medicines you take. What happens before the procedure? 
 Preparing for the procedure 
  · Understand exactly what procedure is planned, along with the risks, benefits, and other options. · Tell your doctors ALL the medicines, vitamins, supplements, and herbal remedies you take. Some of these can increase the risk of bleeding or interact with anesthesia.  
  · If you take aspirin or some other blood thinner, be sure to talk to your doctor. He or she will tell you if you should stop taking these medicines before the procedure. Make sure that you understand exactly what your doctor wants you to do.  
  · Your doctor will tell you which medicines to take or stop before your procedure. You may need to stop taking certain medicines a week or more before the procedure. So talk to your doctor as soon as you can.  
  · If you have an advance directive, let your doctor know. It may include a living will and a durable power of  for health care. Bring a copy to the hospital. If you don't have one, you may want to prepare one. It lets your doctor and loved ones know your health care wishes. Doctors advise that everyone prepare these papers before any type of surgery or procedure. Procedures can be stressful. This information will help you understand what you can expect. And it will help you safely prepare for your procedure. What happens on the day of the procedure? · Follow the instructions exactly about when to stop eating and drinking. If you don't, your procedure may be canceled. If your doctor told you to take your medicines on the day of the procedure, take them with only a sip of water.  
  · Take a bath or shower before you come in for your procedure. Do not apply lotions, perfumes, deodorants, or nail polish.  
  · Take off all jewelry and piercings. And take out contact lenses, if you wear them.  
 At the hospital or surgery center · Bring a picture ID.  
  · You will be kept comfortable and safe by your anesthesia provider. You may get medicine that relaxes you or puts you in a light sleep. The area being worked on will be numb.  
  · The procedure will take about an hour. Going home · Be sure you have someone to drive you home. Anesthesia and pain medicine make it unsafe for you to drive.  
  · You will be given more specific instructions about recovering from your procedure. They will cover things like diet, wound care, follow-up care, driving, and getting back to your normal routine. When should you call your doctor? · You have questions or concerns.  
  · You don't understand how to prepare for your procedure.  
  · You become ill before the procedure (such as fever, flu, or a cold).  
  · You need to reschedule or have changed your mind about having the procedure. Where can you learn more? Go to http://phil-cyril.info/. Enter I286 in the search box to learn more about \"Pacemaker Placement: Before Your Procedure. \" Current as of: December 6, 2017 Content Version: 11.7 © 9544-2382 [employer]. Care instructions adapted under license by [employer] (which disclaims liability or warranty for this information). If you have questions about a medical condition or this instruction, always ask your healthcare professional. [place] [address] any warranty or liability for your use of this information. Heart and Pacemaker: Anatomy Sketch Current as of: May 10, 2017 Content Version: 11.7 © 1954-3465 Handshake. Care instructions adapted under license by [employer] (which disclaims liability or warranty for this information). If you have questions about a medical condition or this instruction, always ask your healthcare professional. [address] any warranty or liability for your use of this information. Cardiac Arrhythmia: Care Instructions Your Care Instructions A cardiac arrhythmia is a change in the normal rhythm of the heart. Your heart may beat too fast or too slow or beat with an irregular or skipping rhythm. A change in the heart's rhythm may feel like a really strong heartbeat or a fluttering in your chest. A severe heart rhythm problem can keep the body from getting the blood it needs. This can result in shortness of breath, lightheadedness, and fainting. You may take medicine to treat your condition. Your doctor may recommend a pacemaker or recommend catheter ablation to destroy small parts of the heart that are causing a rhythm problem. Another possible treatment is an implantable cardioverter-defibrillator (ICD). An ICD is a device that gives the heart a shock to return the heart to a normal rhythm. Follow-up care is a key part of your treatment and safety. Be sure to make and go to all appointments, and call your doctor if you are having problems. It's also a good idea to know your test results and keep a list of the medicines you take. How can you care for yourself at home? 
[facility] care 
  · Be safe with medicines. Take your medicines exactly as prescribed. Call your doctor if you think you are having a problem with your medicine.  You will get more details on the specific medicines your doctor prescribes.  
  · If you received a pacemaker or an ICD, you will get a fact sheet about it.  
  · Wear medical alert jewelry that says you have an abnormal heart rhythm. You can buy this at most drugstores.  
 Lifestyle changes 
  · Eat a heart-healthy diet.  
  · Stay at a healthy weight. Lose weight if you need to.  
  · Avoid nicotine, too much alcohol, and illegal drugs (meth, speed, and cocaine). Also, get enough sleep and do not overeat.  
  · Ask your doctor whether you can take over-the-counter medicines (such as decongestants). These can make your heart beat fast.  
  · Talk to your doctor about any limits to activities, such as driving, or tasks where you use power tools or ladders. Activity 
  · Start light exercise if your doctor says you can. Even a small amount will help you get stronger, have more energy, and manage your stress.  
  · Get regular exercise. Try for 30 minutes on most days of the week. Ask your doctor what level of exercise is safe for you. If activity is not likely to cause health problems, you probably do not have limits on the type or level of activity that you can do. You may want to walk, swim, bike, or do other activities.  
  · When you exercise, watch for signs that your heart is working too hard. You are pushing too hard if you cannot talk while you exercise. If you become short of breath or dizzy or have chest pain, sit down and rest.  
  · Check your pulse daily. Place two fingers on the artery at the palm side of your wrist, in line with your thumb. If your heartbeat seems uneven, talk to your doctor. When should you call for help? Call 911 anytime you think you may need emergency care. For example, call if: 
  · You have symptoms of sudden heart failure. These may include: ¨ Severe trouble breathing. ¨ A fast or irregular heartbeat. ¨ Coughing up pink, foamy mucus. ¨ You passed out.  
  · You have signs of a stroke. These include: 
¨ Sudden numbness, paralysis, or weakness in your face, arm, or leg, especially on only one side of your body. ¨ New problems with walking or balance. ¨ Sudden vision changes. ¨ Drooling or slurred speech. ¨ New problems speaking or understanding simple statements, or feeling confused. ¨ A sudden, severe headache that is different from past headaches.  
 Call your doctor now or seek immediate medical care if: 
  · You have new or changed symptoms of heart failure, such as: ¨ New or increased shortness of breath. ¨ New or worse swelling in your legs, ankles, or feet. ¨ Sudden weight gain, such as more than 2 to 3 pounds in a day or 5 pounds in a week. (Your doctor may suggest a different range of weight gain.) ¨ Feeling dizzy or lightheaded or like you may faint. ¨ Feeling so tired or weak that you cannot do your usual activities. ¨ Not sleeping well. Shortness of breath wakes you at night. You need extra pillows to prop yourself up to breathe easier.  
 Watch closely for changes in your health, and be sure to contact your doctor if: 
  · You do not get better as expected. Where can you learn more? Go to http://phil-cyril.info/. Enter I208 in the search box to learn more about \"Cardiac Arrhythmia: Care Instructions. \" Current as of: December 6, 2017 Content Version: 11.7 © 2062-6848 Getyoo. Care instructions adapted under license by [employer] (which disclaims liability or warranty for this information). If you have questions about a medical condition or this instruction, always ask your healthcare professional. [place] [address] any warranty or liability for your use of this information. Please provide this summary of care documentation to your next provider. Signatures-by signing, you are acknowledging that this After Visit Summary has been reviewed with you and you have received a copy. Patient Signature:  ____________________________________________________________  Date:  ____________________________________________________________  
  
[employer]ine    
    
 Provider Signature:  ____________________________________________________________ Date:  ____________________________________________________________

## 2018-08-25 NOTE — PROGRESS NOTES
1800-TRANSFER - IN REPORT:  Verbal report received from Ekaterina Bradshaw RN(name) on Hailey Rodrigues  being received from ED(unit) for routine progression of care  Report consisted of patients Situation, Background, Assessment and Recommendations(SBAR). Information from the following report(s) SBAR, Kardex, ED Summary, Intake/Output, MAR, Recent Results and Cardiac Rhythm SR with 1st degree AVB and rare pauses was reviewed with the receiving nurse. Opportunity for questions and clarification was provided. Anticipate patient arrival to ICU 14. PAMELLA Jacobo RN  4874-Pt arrived to ICU at this time. States he is feeling well; denies pain. Heart rhythm irregular and multifocal. No pauses observed; pt is currently asymptomatic. Sats high 90's on room air. Family at bedside and updated. Simin PLASENCIA  7835-Bedside and Verbal shift change report given to D. 46 Erum Rodriguez RN (oncoming nurse) by PAMELLA Jacobo RN (offgoing nurse). Report included the following information SBAR, Kardex, Procedure Summary, Intake/Output, MAR, Recent Results and Cardiac Rhythm Varied.  Simin PLASENCIA

## 2018-08-25 NOTE — ED TRIAGE NOTES
Patient arrives from home via EMS. Per EMS patient was working in yard today and has had 3 syncple episodes, one with EMS witnessed. Patient has no medical Hx, takes no Medication,per EMS patient has a HR of 55 or 105.

## 2018-08-25 NOTE — PROGRESS NOTES
[facility]: MED RECONCILIATION      Allergies: Review of patient's allergies indicates no known allergies. Prior to Admission Medications:     Medication Documentation Review Audit       Reviewed by Ezio Tang PHARMD (Pharmacist) on 08/25/18 at 1657         Medication Sig Documenting Provider Last Dose Status Taking?      latanoprost (XALATAN) 0.005 % ophthalmic solution Administer 1 Drop to both eyes nightly.  Historical Provider 8/24/2018 Unknown time Active Yes                  Thank you,      Esthela Weinstein, HugoD, BCPS

## 2018-08-26 LAB
ANION GAP SERPL CALC-SCNC: 8 MMOL/L (ref 5–15)
BASOPHILS # BLD: 0.1 K/UL (ref 0–0.1)
BASOPHILS NFR BLD: 1 % (ref 0–1)
BUN SERPL-MCNC: 14 MG/DL (ref 6–20)
BUN/CREAT SERPL: 15 (ref 12–20)
CALCIUM SERPL-MCNC: 8.4 MG/DL (ref 8.5–10.1)
CHLORIDE SERPL-SCNC: 107 MMOL/L (ref 97–108)
CO2 SERPL-SCNC: 28 MMOL/L (ref 21–32)
CREAT SERPL-MCNC: 0.93 MG/DL (ref 0.7–1.3)
DIFFERENTIAL METHOD BLD: NORMAL
EOSINOPHIL # BLD: 0.2 K/UL (ref 0–0.4)
EOSINOPHIL NFR BLD: 2 % (ref 0–7)
ERYTHROCYTE [DISTWIDTH] IN BLOOD BY AUTOMATED COUNT: 12.7 % (ref 11.5–14.5)
GLUCOSE SERPL-MCNC: 94 MG/DL (ref 65–100)
HCT VFR BLD AUTO: 42 % (ref 36.6–50.3)
HGB BLD-MCNC: 14 G/DL (ref 12.1–17)
IMM GRANULOCYTES # BLD: 0 K/UL (ref 0–0.04)
IMM GRANULOCYTES NFR BLD AUTO: 0 % (ref 0–0.5)
LYMPHOCYTES # BLD: 1.9 K/UL (ref 0.8–3.5)
LYMPHOCYTES NFR BLD: 26 % (ref 12–49)
MAGNESIUM SERPL-MCNC: 2.1 MG/DL (ref 1.6–2.4)
MCH RBC QN AUTO: 31.7 PG (ref 26–34)
MCHC RBC AUTO-ENTMCNC: 33.3 G/DL (ref 30–36.5)
MCV RBC AUTO: 95.2 FL (ref 80–99)
MONOCYTES # BLD: 0.7 K/UL (ref 0–1)
MONOCYTES NFR BLD: 10 % (ref 5–13)
NEUTS SEG # BLD: 4.5 K/UL (ref 1.8–8)
NEUTS SEG NFR BLD: 61 % (ref 32–75)
NRBC # BLD: 0 K/UL (ref 0–0.01)
NRBC BLD-RTO: 0 PER 100 WBC
PHOSPHATE SERPL-MCNC: 3 MG/DL (ref 2.6–4.7)
PLATELET # BLD AUTO: 207 K/UL (ref 150–400)
PMV BLD AUTO: 9.6 FL (ref 8.9–12.9)
POTASSIUM SERPL-SCNC: 3.6 MMOL/L (ref 3.5–5.1)
RBC # BLD AUTO: 4.41 M/UL (ref 4.1–5.7)
SODIUM SERPL-SCNC: 143 MMOL/L (ref 136–145)
TROPONIN I SERPL-MCNC: <0.05 NG/ML
WBC # BLD AUTO: 7.4 K/UL (ref 4.1–11.1)

## 2018-08-26 PROCEDURE — 80048 BASIC METABOLIC PNL TOTAL CA: CPT | Performed by: SPECIALIST

## 2018-08-26 PROCEDURE — 83735 ASSAY OF MAGNESIUM: CPT | Performed by: SPECIALIST

## 2018-08-26 PROCEDURE — 36415 COLL VENOUS BLD VENIPUNCTURE: CPT | Performed by: SPECIALIST

## 2018-08-26 PROCEDURE — 65610000006 HC RM INTENSIVE CARE

## 2018-08-26 PROCEDURE — 84484 ASSAY OF TROPONIN QUANT: CPT | Performed by: SPECIALIST

## 2018-08-26 PROCEDURE — 74011250636 HC RX REV CODE- 250/636: Performed by: INTERNAL MEDICINE

## 2018-08-26 PROCEDURE — 85025 COMPLETE CBC W/AUTO DIFF WBC: CPT | Performed by: SPECIALIST

## 2018-08-26 PROCEDURE — 93306 TTE W/DOPPLER COMPLETE: CPT

## 2018-08-26 PROCEDURE — 84100 ASSAY OF PHOSPHORUS: CPT | Performed by: SPECIALIST

## 2018-08-26 RX ORDER — HYDRALAZINE HYDROCHLORIDE 20 MG/ML
10 INJECTION INTRAMUSCULAR; INTRAVENOUS
Status: DISCONTINUED | OUTPATIENT
Start: 2018-08-26 | End: 2018-08-28 | Stop reason: HOSPADM

## 2018-08-26 RX ADMIN — Medication 10 ML: at 21:33

## 2018-08-26 RX ADMIN — Medication 10 ML: at 15:49

## 2018-08-26 RX ADMIN — Medication 10 ML: at 22:00

## 2018-08-26 RX ADMIN — Medication 10 ML: at 06:39

## 2018-08-26 RX ADMIN — LATANOPROST 1 DROP: 50 SOLUTION OPHTHALMIC at 21:32

## 2018-08-26 RX ADMIN — ENOXAPARIN SODIUM 40 MG: 40 INJECTION, SOLUTION INTRAVENOUS; SUBCUTANEOUS at 15:49

## 2018-08-26 NOTE — PROGRESS NOTES
Emili Winchester MD [facility] [address]   Office [phone]  Mobile [phone]            NAME:  Miryam Segovia   :   1929   MRN:   [id number]     Assessment/Plan:   1. Intermittent high grade AVB with recent syncope  2. Elevated BP without antecedent HTN     Review echo  Continue ICU monitoring  Dual chamber PPM tomorrow    Subjective:   Cardiac ROS: no recurrent spells but frequent bursts of mobitz 2 and CHB. Patient denies any exertional chest pain, dyspnea, palpitations, syncope, orthopnea, edema or paroxysmal nocturnal dyspnea. Review of Systems: No nausea, indigestion, vomiting, pain, cough, sputum. No bleeding. Taking po. Appetite good      Objective:     Visit Vitals    /70    Pulse 80    Temp 98.2 °F (36.8 °C)    Resp 16    Ht 5' 8.5\" (1.74 m)    Wt 138 lb 7.2 oz (62.8 kg)    SpO2 95%    BMI 20.74 kg/m2      O2 Device: Room air    Temp (24hrs), Av.2 °F (36.8 °C), Min:97.8 °F (36.6 °C), Max:98.8 °F (37.1 °C)       07 -  1900  In: 100 [P.O.:100]  Out: 200 [Urine:200]    1901 -  0700  In: 90 [P.O.:90]  Out: 1325 [Urine:1325]    TELE: sinus, bursts of 2nd degree/3rd degree AVB    General: AAOx3 cooperative, no acute distress. HEENT: Atraumatic. Pink and moist.  Anicteric sclerae. Neck : Supple, no thyromegaly. Lungs: CTA bilaterally. No wheezing/rhonchi/rales. Heart: Regular rhythm, no murmur, no rubs, no gallops. No JVD. No carotid bruits. Abdomen: Soft, non-distended, non-tender. + Bowel sounds. No bruits. Extremities: No edema, no clubbing, no cyanosis. No calf tenderness  Neurologic: Grossly intact. Alert and oriented X 3. No acute neurological distress. Psych: Good insight. Not anxious nor agitated.     Additional comments: None    Care Plan discussed with:    Comments   Patient x    Family  x    RN x    Care Manager                    Consultant:          Data Review:     EKG    Echo    No results for input(s): TNIPOC in the last 72 hours. No lab exists for component: ITNL   Recent Labs      08/26/18   0356  08/25/18   1418   TROIQ  <0.05  <0.05     Recent Labs      08/26/18   0356  08/25/18   1418   NA  143  141   K  3.6  4.0   CL  107  104   CO2  28  28   BUN  14  15   CREA  0.93  1.14   GLU  94  145*   PHOS  3.0   --    MG  2.1  2.2   ALB   --   3.7   WBC  7.4  8.1   HGB  14.0  13.4   HCT  42.0  40.1   PLT  207  209     No results for input(s): INR, PTP, APTT in the last 72 hours.     No lab exists for component: INREXT    Medications reviewed  Current Facility-Administered Medications   Medication Dose Route Frequency    hydrALAZINE (APRESOLINE) 20 mg/mL injection 10 mg  10 mg IntraVENous Q6H PRN    atropine injection 1 mg  1 mg IntraVENous PRN    sodium chloride (NS) flush 5-10 mL  5-10 mL IntraVENous Q8H    sodium chloride (NS) flush 5-10 mL  5-10 mL IntraVENous PRN    acetaminophen (TYLENOL) tablet 650 mg  650 mg Oral Q4H PRN    enoxaparin (LOVENOX) injection 40 mg  40 mg SubCUTAneous Q24H    latanoprost (XALATAN) 0.005 % ophthalmic solution 1 Drop  1 Drop Both Eyes QHS         Bushra Olsen MD

## 2018-08-26 NOTE — PROGRESS NOTES
Shift Summary    0700:  Bedside report done. Patient resting quietly in bed with call bell within reach. Patient instructed to ring for nursing staff and not to get up alone for his safety. Urinal within reach. 3287:  Breakfast at the bedside. Patient denies any needs or complaints at this time. 0820:  Patent having frequent runs of CHB. Per patient, when episodes happen, he has a split second where he feels \"something\" then it is over. 0120:  Pulmonary at the bedside to assess. 0915:  Assisted patient to bath and change bedding. No other changes noted/    0950:  2D echo in progress. 1100:  Family at the bedside. 1105:  Dr. Mansi Sanchez at the bedside. 1200:  Family at the bedside visiting. No needs voiced at this time. 1310: Family at the bedside. No changes noted. 1400:  Patient resting after eating lunch. He remain asymptomatic.    1500:  Patient has been noted to be having increased frequency of back to back pauses with as many as three in a row. Patient asymptomatic. Dr. Mansi Sanchez paged. 1505:  Spoke with Dr. Mansi Sanchez on the phone. Informed of patient's back to back pauses with as may as there in a row with only one beat in between. Patient remains asymptomatic. No new orders at this time. 1600:  Patient resting in bed with wife at the bedside. Patient voices feeling better this afternoon. No changes noted at this time. Patient still denies feeling dizzy or lightheaded, nausea, or pain. 1615: Wife gave nursing staff advanced directives. Copy made and placed on chart. 1700:  Patient resting in bed. No changes ordered. 1800:  Family at the bedside. Patient reading over consent prior to signing. 1845:  Patient talking on the phone. No needs voiced.

## 2018-08-26 NOTE — CONSULTS
[facility] Emergency Department  INTENSIVIST DAILY PROGRESS NOTE  Name: Eleonora Torres   : 1929   MRN: [id number]   Date: 2018 8:27 AM   Cc; passing out  79 yo with no chronic health issues, admitted with episode of passing out, fatigue, sudden, came to the er, found to be be in chb, admitted to the icu, no cp, no sob, no abd pain, did not require any pacing so far, feels better today  pmh none  meds none  Allergies none  Sh no smoke, no alcohol  Fh not sig for heart dz  Lives with wife, very active                 ROS: more than 10 systems covered, unrarkable    Vital Signs:    Visit Vitals    BP (!) 131/97    Pulse 75    Temp 97.8 °F (36.6 °C)    Resp 21    Ht 5' 8.5\" (1.74 m)    Wt 62.8 kg (138 lb 7.2 oz)    SpO2 97%    BMI 20.74 kg/m2       O2 Device: Room air       Temp (24hrs), Av.2 °F (36.8 °C), Min:97.8 °F (36.6 °C), Max:98.8 °F (37.1 °C)       Intake/Output:   Last shift:         Last 3 shifts:  1901 -  0700  In: 90 [P.O.:90]  Out: 1325 [Urine:1325]    Intake/Output Summary (Last 24 hours) at 18 0827  Last data filed at 18 0630   Gross per 24 hour   Intake               90 ml   Output             1325 ml   Net            -1235 ml                Physical Exam:  General:  Alert, cooperative, no distress, appears stated age. Head:  Normocephalic, without obvious abnormality, atraumatic. Eyes:  Conjunctivae/corneas clear. PERRL, EOMs intact. Nose: Nares normal. Septum midline. Mucosa normal. No drainage or sinus tenderness. Throat: Lips, mucosa, and tongue normal. Teeth and gums normal.   Neck: Supple, symmetrical, trachea midline, no adenopathy, thyroid: no enlargment/tenderness/nodules, no carotid bruit and no JVD. Back:   Symmetric, no curvature. ROM normal.   Lungs:   Clear to auscultation bilaterally. Chest wall:  No tenderness or deformity. Heart:  IRRegular rate and rhythm, S1, S2 normal, no murmur, click, rub or gallop.    Abdomen:   Soft, non-tender. Bowel sounds normal. No masses,  No organomegaly. Extremities: Extremities normal, atraumatic, no cyanosis or edema. Pulses: 2+ and symmetric all extremities. Skin: Skin color, texture, turgor normal. No rashes or lesions   Lymph nodes: Cervical, supraclavicular, and axillary nodes normal.   Neurologic: Grossly nonfocal       DATA:   Current Facility-Administered Medications   Medication Dose Route Frequency    hydrALAZINE (APRESOLINE) 20 mg/mL injection 10 mg  10 mg IntraVENous Q6H PRN    atropine injection 1 mg  1 mg IntraVENous PRN    sodium chloride (NS) flush 5-10 mL  5-10 mL IntraVENous Q8H    sodium chloride (NS) flush 5-10 mL  5-10 mL IntraVENous PRN    acetaminophen (TYLENOL) tablet 650 mg  650 mg Oral Q4H PRN    enoxaparin (LOVENOX) injection 40 mg  40 mg SubCUTAneous Q24H    latanoprost (XALATAN) 0.005 % ophthalmic solution 1 Drop  1 Drop Both Eyes QHS       Telemetry:          Labs:  Recent Labs      08/26/18   0356  08/25/18   1418   WBC  7.4  8.1   HGB  14.0  13.4   HCT  42.0  40.1   PLT  207  209     Recent Labs      08/26/18   0356  08/25/18   1418   NA  143  141   K  3.6  4.0   CL  107  104   CO2  28  28   GLU  94  145*   BUN  14  15   CREA  0.93  1.14   CA  8.4*  8.6   MG  2.1  2.2   PHOS  3.0   --    ALB   --   3.7   SGOT   --   22   ALT   --   21     No results for input(s): PH, PCO2, PO2, HCO3, FIO2 in the last 72 hours.       IMPRESSION:   · Complete heart block  · Syncopy/near syncopy   PLAN:   · Plan for ppm  As per cards  · Pacer pads on board  · Could try isoproternol if needed  · Other icu protocols as needed     My assessment/plan was discussed Melba Kruse MD

## 2018-08-26 NOTE — PROGRESS NOTES
1905 Bedside and Verbal shift change report received from Doris Davis RN (offgoing nurse) by Michela Eaton, ERINN (oncoming nurse). Report included the following information SBAR, Kardex, ED Summary, Intake/Output, MAR, Accordion, Recent Results and Cardiac Rhythm SR, BBB, 1st degree AVB and 3rd degree HB. During report, monitor showed labile  rhythms with varying degrees of nonsustained heartblock including transient CHB. Pt denies associated complaints and remains alert, cognizant and verbally responsive with clear speech. Pacer pads in place. 1930 Teaching initiated with pt and family regarding pt's irregular rhythm, POC and pacemakers (insertion, procedures and pictures). Pt's wife, daughter and son in law all verbalized understanding after all questions answered. 2000 Pt stood to transfer to working bed and tracy well.  2200 EMS PIV removed per protocol and new IV access X2 inserted. 0000 Monitor cont to show frequent intervals of varying degrees of self limited CHB. Pt remains asymptomatic and without noted deficits during rhythm changes. 0200 Pt voids small amts but denies any complaints. Irregular cardiac rhythms persist. On inquiry, pt states that during the shift, he may have had possible brief periods of dizziness that may have lasted a few seconds, but no pain nor SOB  0400 Am labs drawn and sent. 0700 Bedside and Verbal shift change report given to Lenore Lu RN (oncoming nurse) by Michela Eaton, ERINN (offgoing nurse). Report included the following information SBAR, Kardex, OR Summary, Intake/Output, MAR, Accordion, Recent Results, Cardiac Rhythm SR with frequest periods of self limited CHB and varying degress of heartblock and Alarm Parameters .

## 2018-08-26 NOTE — ROUTINE PROCESS
0700 Shift change report received from [facility]. SBAR, Kardex, Intake/Output, MAR, Accordion, Recent Results, Med Rec Status and Cardiac Rhythm SR with runs of CHB and 2 degree HB reviewed. 1900  Bedside report given to [facility]. SBAR, Kardex, Intake/Output, MAR, Accordion, Recent Results and Cardiac Rhythm SA, 1st degree, 2nd degree type 2, nonsustained CHB. reviewed. Patient is stable at this time. Call light within reach, bed alarm on, bed in low position.

## 2018-08-26 NOTE — PROGRESS NOTES
Ahsan Villarrealelsen [facility] [address]  [address] [facility], [address] [place] [address]  [phone]      Medical Progress Note      NAME: Kenya Rondon   :  1929  MRM:  [id number]    Date/Time: 2018  8:03 AM         Subjective:     Chief Complaint:  Syncope: no further but continues to have near syncopal symptoms that correlate with episodes 3rd degree AV block on the monitor    ROS:  (bold if positive, if negative)                        Tolerating Diet          Objective:       Vitals:          Last 24hrs VS reviewed since prior progress note.  Most recent are:    Visit Vitals    BP (!) 133/96    Pulse 71    Temp 97.8 °F (36.6 °C)    Resp 19    Ht 5' 8.5\" (1.74 m)    Wt 62.8 kg (138 lb 7.2 oz)    SpO2 96%    BMI 20.74 kg/m2     SpO2 Readings from Last 6 Encounters:   18 96%          Intake/Output Summary (Last 24 hours) at 18 0803  Last data filed at 18 0630   Gross per 24 hour   Intake               90 ml   Output             1325 ml   Net            -1235 ml          Exam:     Physical Exam:    Gen:  Well-developed, well-nourished, in no acute distress  HEENT:  Pink conjunctivae, PERRL, hearing intact to voice, moist mucous membranes  Neck:  Supple, without masses, thyroid non-tender  Resp:  No accessory muscle use, clear breath sounds without wheezes rales or rhonchi  Card:  No murmurs, normal S1, S2 without thrills, bruits or peripheral edema  Abd:  Soft, non-tender, non-distended, normoactive bowel sounds are present, no palpable organomegaly and no detectable hernias  Lymph:  No cervical or inguinal adenopathy  Musc:  No cyanosis or clubbing  Skin:  No rashes or ulcers, skin turgor is good  Neuro:  Cranial nerves are grossly intact, no focal motor weakness, follows commands appropriately  Psych:  Good insight, oriented to person, place and time, alert       Telemetry reviewed:   sinus rhythm, sinus emmanuel and intermittent complete heart block    Medications Reviewed: (see below)    Lab Data Reviewed: (see below)    ______________________________________________________________________    Medications:     Current Facility-Administered Medications   Medication Dose Route Frequency    atropine injection 1 mg  1 mg IntraVENous PRN    sodium chloride (NS) flush 5-10 mL  5-10 mL IntraVENous Q8H    sodium chloride (NS) flush 5-10 mL  5-10 mL IntraVENous PRN    acetaminophen (TYLENOL) tablet 650 mg  650 mg Oral Q4H PRN    enoxaparin (LOVENOX) injection 40 mg  40 mg SubCUTAneous Q24H    hydrALAZINE (APRESOLINE) 20 mg/mL injection 10 mg  10 mg IntraVENous Q6H PRN    latanoprost (XALATAN) 0.005 % ophthalmic solution 1 Drop  1 Drop Both Eyes QHS            Lab Review:     Recent Labs      08/26/18   0356  08/25/18   1418   WBC  7.4  8.1   HGB  14.0  13.4   HCT  42.0  40.1   PLT  207  209     Recent Labs      08/26/18   0356  08/25/18   1418   NA  143  141   K  3.6  4.0   CL  107  104   CO2  28  28   GLU  94  145*   BUN  14  15   CREA  0.93  1.14   CA  8.4*  8.6   MG  2.1  2.2   PHOS  3.0   --    ALB   --   3.7   TBILI   --   1.3*   SGOT   --   22   ALT   --   21     No results found for: GLUCPOC  No results for input(s): PH, PCO2, PO2, HCO3, FIO2 in the last 72 hours. No results for input(s): INR in the last 72 hours.     No lab exists for component: INREXT  No results found for: SDES  No results found for: CULT         Assessment:     Principal Problem:    Syncope and collapse (8/25/2018)    Active Problems:    Symptomatic bradycardia (8/25/2018)      Elevated blood pressure reading without diagnosis of hypertension (8/25/2018)      Right bundle branch block (8/25/2018)           Plan:     Principal Problem:    Syncope and collapse (8/25/2018)/Symptomatic bradycardia (8/25/2018)/3rd degree heart block/Right bundle branch block (8/25/2018)     - with symptoms as above   - continue close monitoring in ICU   - needs urgent pacemaker placement    Active Problems:    Elevated blood pressure reading without diagnosis of hypertension (8/25/2018)   - will not treat HTN for now in light of syncope and conduction system issues   - modified parameters of PRN hydralazine to limit usage      Total time spent in patient care: 25 minutes                  Care Plan discussed with: Patient, Care Manager and Nursing Staff    Discussed:  Code Status, Care Plan and D/C Planning    Prophylaxis:  Lovenox    Disposition:  Home w/Family           ___________________________________________________    Attending Physician: Conrado Guerra MD

## 2018-08-26 NOTE — ED NOTES
Pt noted with a pause on cardiac monitor per Dr. Jenny Farooq. Defib pads placed on pt with code cart at bedside. Pt denies current dizziness, chest pain, or SOB. Spouse remains at bedside. Will continue to monitor.

## 2018-08-27 ENCOUNTER — APPOINTMENT (OUTPATIENT)
Dept: GENERAL RADIOLOGY | Age: 83
DRG: 244 | End: 2018-08-27
Attending: INTERNAL MEDICINE
Payer: MEDICARE

## 2018-08-27 ENCOUNTER — APPOINTMENT (OUTPATIENT)
Dept: GENERAL RADIOLOGY | Age: 83
DRG: 244 | End: 2018-08-27
Attending: NURSE PRACTITIONER
Payer: MEDICARE

## 2018-08-27 LAB
ANION GAP SERPL CALC-SCNC: 7 MMOL/L (ref 5–15)
APTT PPP: 27 SEC (ref 22.1–32)
BASOPHILS # BLD: 0.1 K/UL (ref 0–0.1)
BASOPHILS NFR BLD: 1 % (ref 0–1)
BUN SERPL-MCNC: 15 MG/DL (ref 6–20)
BUN/CREAT SERPL: 16 (ref 12–20)
CALCIUM SERPL-MCNC: 8.4 MG/DL (ref 8.5–10.1)
CHLORIDE SERPL-SCNC: 106 MMOL/L (ref 97–108)
CO2 SERPL-SCNC: 28 MMOL/L (ref 21–32)
CREAT SERPL-MCNC: 0.96 MG/DL (ref 0.7–1.3)
DIFFERENTIAL METHOD BLD: NORMAL
EOSINOPHIL # BLD: 0.2 K/UL (ref 0–0.4)
EOSINOPHIL NFR BLD: 3 % (ref 0–7)
ERYTHROCYTE [DISTWIDTH] IN BLOOD BY AUTOMATED COUNT: 12.8 % (ref 11.5–14.5)
GLUCOSE SERPL-MCNC: 94 MG/DL (ref 65–100)
HCT VFR BLD AUTO: 42 % (ref 36.6–50.3)
HGB BLD-MCNC: 14.2 G/DL (ref 12.1–17)
IMM GRANULOCYTES # BLD: 0 K/UL (ref 0–0.04)
IMM GRANULOCYTES NFR BLD AUTO: 0 % (ref 0–0.5)
INR PPP: 1.1 (ref 0.9–1.1)
LYMPHOCYTES # BLD: 1.8 K/UL (ref 0.8–3.5)
LYMPHOCYTES NFR BLD: 23 % (ref 12–49)
MCH RBC QN AUTO: 32.1 PG (ref 26–34)
MCHC RBC AUTO-ENTMCNC: 33.8 G/DL (ref 30–36.5)
MCV RBC AUTO: 95 FL (ref 80–99)
MONOCYTES # BLD: 0.7 K/UL (ref 0–1)
MONOCYTES NFR BLD: 9 % (ref 5–13)
NEUTS SEG # BLD: 5.1 K/UL (ref 1.8–8)
NEUTS SEG NFR BLD: 64 % (ref 32–75)
NRBC # BLD: 0 K/UL (ref 0–0.01)
NRBC BLD-RTO: 0 PER 100 WBC
PLATELET # BLD AUTO: 215 K/UL (ref 150–400)
PMV BLD AUTO: 9.8 FL (ref 8.9–12.9)
POTASSIUM SERPL-SCNC: 3.7 MMOL/L (ref 3.5–5.1)
PROTHROMBIN TIME: 10.8 SEC (ref 9–11.1)
RBC # BLD AUTO: 4.42 M/UL (ref 4.1–5.7)
SODIUM SERPL-SCNC: 141 MMOL/L (ref 136–145)
THERAPEUTIC RANGE,PTTT: NORMAL SECS (ref 58–77)
WBC # BLD AUTO: 8 K/UL (ref 4.1–11.1)

## 2018-08-27 PROCEDURE — 74011636320 HC RX REV CODE- 636/320: Performed by: INTERNAL MEDICINE

## 2018-08-27 PROCEDURE — 74011250636 HC RX REV CODE- 250/636

## 2018-08-27 PROCEDURE — 77030018547 HC SUT ETHBND1 J&J -B

## 2018-08-27 PROCEDURE — 02H63JZ INSERTION OF PACEMAKER LEAD INTO RIGHT ATRIUM, PERCUTANEOUS APPROACH: ICD-10-PCS | Performed by: INTERNAL MEDICINE

## 2018-08-27 PROCEDURE — 71045 X-RAY EXAM CHEST 1 VIEW: CPT

## 2018-08-27 PROCEDURE — 71046 X-RAY EXAM CHEST 2 VIEWS: CPT

## 2018-08-27 PROCEDURE — 77030037713 HC CLOSR DEV INCIS ZIP STRY -B

## 2018-08-27 PROCEDURE — 94762 N-INVAS EAR/PLS OXIMTRY CONT: CPT

## 2018-08-27 PROCEDURE — 74011250636 HC RX REV CODE- 250/636: Performed by: INTERNAL MEDICINE

## 2018-08-27 PROCEDURE — C1892 INTRO/SHEATH,FIXED,PEEL-AWAY: HCPCS

## 2018-08-27 PROCEDURE — 77030031139 HC SUT VCRL2 J&J -A

## 2018-08-27 PROCEDURE — 77030002933 HC SUT MCRYL J&J -A

## 2018-08-27 PROCEDURE — 65270000029 HC RM PRIVATE

## 2018-08-27 PROCEDURE — 36415 COLL VENOUS BLD VENIPUNCTURE: CPT | Performed by: INTERNAL MEDICINE

## 2018-08-27 PROCEDURE — 77030012935 HC DRSG AQUACEL BMS -B

## 2018-08-27 PROCEDURE — 02HK3JZ INSERTION OF PACEMAKER LEAD INTO RIGHT VENTRICLE, PERCUTANEOUS APPROACH: ICD-10-PCS | Performed by: INTERNAL MEDICINE

## 2018-08-27 PROCEDURE — C1785 PMKR, DUAL, RATE-RESP: HCPCS | Performed by: INTERNAL MEDICINE

## 2018-08-27 PROCEDURE — 74011000250 HC RX REV CODE- 250: Performed by: INTERNAL MEDICINE

## 2018-08-27 PROCEDURE — 77030018673

## 2018-08-27 PROCEDURE — 85730 THROMBOPLASTIN TIME PARTIAL: CPT | Performed by: INTERNAL MEDICINE

## 2018-08-27 PROCEDURE — 85610 PROTHROMBIN TIME: CPT | Performed by: INTERNAL MEDICINE

## 2018-08-27 PROCEDURE — 80048 BASIC METABOLIC PNL TOTAL CA: CPT | Performed by: INTERNAL MEDICINE

## 2018-08-27 PROCEDURE — 99152 MOD SED SAME PHYS/QHP 5/>YRS: CPT

## 2018-08-27 PROCEDURE — 85025 COMPLETE CBC W/AUTO DIFF WBC: CPT | Performed by: INTERNAL MEDICINE

## 2018-08-27 PROCEDURE — 93005 ELECTROCARDIOGRAM TRACING: CPT

## 2018-08-27 PROCEDURE — 0JH606Z INSERTION OF PACEMAKER, DUAL CHAMBER INTO CHEST SUBCUTANEOUS TISSUE AND FASCIA, OPEN APPROACH: ICD-10-PCS | Performed by: INTERNAL MEDICINE

## 2018-08-27 PROCEDURE — C1898 LEAD, PMKR, OTHER THAN TRANS: HCPCS | Performed by: INTERNAL MEDICINE

## 2018-08-27 PROCEDURE — A4565 SLINGS: HCPCS

## 2018-08-27 RX ORDER — MIDAZOLAM HYDROCHLORIDE 1 MG/ML
.5-1 INJECTION, SOLUTION INTRAMUSCULAR; INTRAVENOUS
Status: DISCONTINUED | OUTPATIENT
Start: 2018-08-27 | End: 2018-08-27 | Stop reason: HOSPADM

## 2018-08-27 RX ORDER — ONDANSETRON 2 MG/ML
4 INJECTION INTRAMUSCULAR; INTRAVENOUS
Status: DISCONTINUED | OUTPATIENT
Start: 2018-08-27 | End: 2018-08-28 | Stop reason: HOSPADM

## 2018-08-27 RX ORDER — HEPARIN SODIUM 200 [USP'U]/100ML
500 INJECTION, SOLUTION INTRAVENOUS ONCE
Status: COMPLETED | OUTPATIENT
Start: 2018-08-27 | End: 2018-08-27

## 2018-08-27 RX ORDER — CEFAZOLIN SODIUM/WATER 2 G/20 ML
2 SYRINGE (ML) INTRAVENOUS EVERY 8 HOURS
Status: COMPLETED | OUTPATIENT
Start: 2018-08-27 | End: 2018-08-28

## 2018-08-27 RX ORDER — BUPIVACAINE HYDROCHLORIDE 5 MG/ML
20 INJECTION, SOLUTION EPIDURAL; INTRACAUDAL ONCE
Status: COMPLETED | OUTPATIENT
Start: 2018-08-27 | End: 2018-08-27

## 2018-08-27 RX ORDER — DIPHENHYDRAMINE HYDROCHLORIDE 50 MG/ML
25-50 INJECTION, SOLUTION INTRAMUSCULAR; INTRAVENOUS ONCE
Status: COMPLETED | OUTPATIENT
Start: 2018-08-27 | End: 2018-08-27

## 2018-08-27 RX ORDER — ACETAMINOPHEN 325 MG/1
650 TABLET ORAL
Status: DISCONTINUED | OUTPATIENT
Start: 2018-08-27 | End: 2018-08-27 | Stop reason: SDUPTHER

## 2018-08-27 RX ORDER — SODIUM CHLORIDE 0.9 % (FLUSH) 0.9 %
5-10 SYRINGE (ML) INJECTION EVERY 8 HOURS
Status: DISCONTINUED | OUTPATIENT
Start: 2018-08-27 | End: 2018-08-28 | Stop reason: HOSPADM

## 2018-08-27 RX ORDER — SODIUM CHLORIDE 0.9 % (FLUSH) 0.9 %
5-10 SYRINGE (ML) INJECTION AS NEEDED
Status: DISCONTINUED | OUTPATIENT
Start: 2018-08-27 | End: 2018-08-28 | Stop reason: HOSPADM

## 2018-08-27 RX ORDER — HEPARIN SODIUM 200 [USP'U]/100ML
INJECTION, SOLUTION INTRAVENOUS
Status: COMPLETED
Start: 2018-08-27 | End: 2018-08-27

## 2018-08-27 RX ORDER — FENTANYL CITRATE 50 UG/ML
25-200 INJECTION, SOLUTION INTRAMUSCULAR; INTRAVENOUS
Status: DISCONTINUED | OUTPATIENT
Start: 2018-08-27 | End: 2018-08-27 | Stop reason: HOSPADM

## 2018-08-27 RX ORDER — CEPHALEXIN 250 MG/1
500 CAPSULE ORAL 3 TIMES DAILY
Status: DISCONTINUED | OUTPATIENT
Start: 2018-08-28 | End: 2018-08-28 | Stop reason: HOSPADM

## 2018-08-27 RX ORDER — CEFAZOLIN SODIUM/WATER 2 G/20 ML
2 SYRINGE (ML) INTRAVENOUS ONCE
Status: COMPLETED | OUTPATIENT
Start: 2018-08-27 | End: 2018-08-27

## 2018-08-27 RX ORDER — ONDANSETRON 2 MG/ML
INJECTION INTRAMUSCULAR; INTRAVENOUS
Status: COMPLETED
Start: 2018-08-27 | End: 2018-08-27

## 2018-08-27 RX ORDER — LIDOCAINE HYDROCHLORIDE AND EPINEPHRINE 10; 10 MG/ML; UG/ML
20 INJECTION, SOLUTION INFILTRATION; PERINEURAL ONCE
Status: COMPLETED | OUTPATIENT
Start: 2018-08-27 | End: 2018-08-27

## 2018-08-27 RX ORDER — GENTAMICIN SULFATE 80 MG/100ML
80 INJECTION, SOLUTION INTRAVENOUS ONCE
Status: COMPLETED | OUTPATIENT
Start: 2018-08-27 | End: 2018-08-27

## 2018-08-27 RX ORDER — HYDROCODONE BITARTRATE AND ACETAMINOPHEN 5; 325 MG/1; MG/1
1 TABLET ORAL
Status: DISCONTINUED | OUTPATIENT
Start: 2018-08-27 | End: 2018-08-28 | Stop reason: HOSPADM

## 2018-08-27 RX ORDER — ONDANSETRON 2 MG/ML
4 INJECTION INTRAMUSCULAR; INTRAVENOUS AS NEEDED
Status: DISCONTINUED | OUTPATIENT
Start: 2018-08-27 | End: 2018-08-28 | Stop reason: HOSPADM

## 2018-08-27 RX ADMIN — LATANOPROST 1 DROP: 50 SOLUTION OPHTHALMIC at 21:21

## 2018-08-27 RX ADMIN — FENTANYL CITRATE 25 MCG: 50 INJECTION, SOLUTION INTRAMUSCULAR; INTRAVENOUS at 07:11

## 2018-08-27 RX ADMIN — ONDANSETRON 4 MG: 2 INJECTION, SOLUTION INTRAMUSCULAR; INTRAVENOUS at 07:06

## 2018-08-27 RX ADMIN — HEPARIN SODIUM 1000 UNITS: 200 INJECTION, SOLUTION INTRAVENOUS at 07:13

## 2018-08-27 RX ADMIN — Medication 10 ML: at 13:16

## 2018-08-27 RX ADMIN — DIPHENHYDRAMINE HYDROCHLORIDE 25 MG: 50 INJECTION, SOLUTION INTRAMUSCULAR; INTRAVENOUS at 07:06

## 2018-08-27 RX ADMIN — IOPAMIDOL 15 ML: 755 INJECTION, SOLUTION INTRAVENOUS at 07:15

## 2018-08-27 RX ADMIN — HEPARIN SODIUM IN SODIUM CHLORIDE 1000 UNITS: 200 INJECTION INTRAVENOUS at 07:13

## 2018-08-27 RX ADMIN — LIDOCAINE HYDROCHLORIDE,EPINEPHRINE BITARTRATE 200 MG: 10; .01 INJECTION, SOLUTION INFILTRATION; PERINEURAL at 07:14

## 2018-08-27 RX ADMIN — Medication 2 G: at 06:59

## 2018-08-27 RX ADMIN — Medication 2 G: at 21:00

## 2018-08-27 RX ADMIN — Medication 2 G: at 13:15

## 2018-08-27 RX ADMIN — SODIUM CHLORIDE: 900 INJECTION, SOLUTION INTRAVENOUS at 07:36

## 2018-08-27 RX ADMIN — MIDAZOLAM HYDROCHLORIDE 1 MG: 1 INJECTION, SOLUTION INTRAMUSCULAR; INTRAVENOUS at 07:11

## 2018-08-27 RX ADMIN — ENOXAPARIN SODIUM 40 MG: 40 INJECTION, SOLUTION INTRAVENOUS; SUBCUTANEOUS at 16:40

## 2018-08-27 RX ADMIN — GENTAMICIN SULFATE 80 MG: 80 INJECTION, SOLUTION INTRAVENOUS at 07:14

## 2018-08-27 RX ADMIN — BUPIVACAINE HYDROCHLORIDE 100 MG: 5 INJECTION, SOLUTION EPIDURAL; INTRACAUDAL; PERINEURAL at 07:13

## 2018-08-27 RX ADMIN — Medication 10 ML: at 09:04

## 2018-08-27 NOTE — PROGRESS NOTES
Ahsan Nice john [place] [address]  [address] [facility], [address] [place] [address]  [phone]      Medical Progress Note      NAME: Tim Fuller   :  1929  MRM:  [id number]    Date/Time: 2018  12:51 PM       Assessment and Plan:   1. Syncope and collapse (2018)/Symptomatic bradycardia (2018)/3rd degree heart block/Right bundle branch block (2018). S/p pacemaker on . Management per cardiology      2.  Elevated blood pressure reading without diagnosis of hypertension (2018). BP stable currently. Monitor.                                               Subjective:     Chief Complaint:  Follow up of pt who was admitted with syncope and AV block. Feels well. ROS:  (bold if positive, if negative)      Tolerating PT  Tolerating Diet        Objective:     Last 24hrs VS reviewed since prior progress note.  Most recent are:    Visit Vitals    /62    Pulse 86    Temp 98.3 °F (36.8 °C)    Resp 18    Ht 5' 8.5\" (1.74 m)    Wt 62.8 kg (138 lb 7.2 oz)    SpO2 96%    BMI 20.74 kg/m2     SpO2 Readings from Last 6 Encounters:   18 96%          Intake/Output Summary (Last 24 hours) at 18 1251  Last data filed at 18 0800   Gross per 24 hour   Intake              340 ml   Output             1900 ml   Net            -1560 ml        Physical Exam:    Gen:  Well-developed, well-nourished, in no acute distress  HEENT:  Pink conjunctivae, PERRL, hearing intact to voice, moist mucous membranes  Neck:  Supple, without masses, thyroid non-tender  Resp:  No accessory muscle use, clear breath sounds without wheezes rales or rhonchi  Card:  No murmurs, normal S1, S2 without thrills, bruits or peripheral edema  Abd:  Soft, non-tender, non-distended, normoactive bowel sounds are present, no palpable organomegaly and no detectable hernias  Lymph:  No cervical or inguinal adenopathy  Musc:  No cyanosis or clubbing  Skin:  No rashes or ulcers, skin turgor is good  Neuro: Cranial nerves are grossly intact, no focal motor weakness, follows commands appropriately  Psych:  Good insight, oriented to person, place and time, alert  __________________________________________________________________  Medications Reviewed: (see below)  Medications:     Current Facility-Administered Medications   Medication Dose Route Frequency    ondansetron (ZOFRAN) injection 4 mg  4 mg IntraVENous PRN    sodium chloride (NS) flush 5-10 mL  5-10 mL IntraVENous Q8H    sodium chloride (NS) flush 5-10 mL  5-10 mL IntraVENous PRN    HYDROcodone-acetaminophen (NORCO) 5-325 mg per tablet 1 Tab  1 Tab Oral Q4H PRN    ondansetron (ZOFRAN) injection 4 mg  4 mg IntraVENous Q4H PRN    ceFAZolin (ANCEF) 2 g/20 mL in sterile water IV syringe  2 g IntraVENous Q8H    [START ON 8/28/2018] cephALEXin (KEFLEX) capsule 500 mg  500 mg Oral TID    hydrALAZINE (APRESOLINE) 20 mg/mL injection 10 mg  10 mg IntraVENous Q6H PRN    atropine injection 1 mg  1 mg IntraVENous PRN    sodium chloride (NS) flush 5-10 mL  5-10 mL IntraVENous Q8H    sodium chloride (NS) flush 5-10 mL  5-10 mL IntraVENous PRN    acetaminophen (TYLENOL) tablet 650 mg  650 mg Oral Q4H PRN    enoxaparin (LOVENOX) injection 40 mg  40 mg SubCUTAneous Q24H    latanoprost (XALATAN) 0.005 % ophthalmic solution 1 Drop  1 Drop Both Eyes QHS        Lab Data Reviewed: (see below)  Lab Review:     Recent Labs      08/27/18   0327  08/26/18   0356  08/25/18   1418   WBC  8.0  7.4  8.1   HGB  14.2  14.0  13.4   HCT  42.0  42.0  40.1   PLT  215  207  209     Recent Labs      08/27/18   0327  08/26/18   0356  08/25/18   1418   NA  141  143  141   K  3.7  3.6  4.0   CL  106  107  104   CO2  28  28  28   GLU  94  94  145*   BUN  15  14  15   CREA  0.96  0.93  1.14   CA  8.4*  8.4*  8.6   MG   --   2.1  2.2   PHOS   --   3.0   --    ALB   --    --   3.7   TBILI   --    --   1.3*   SGOT   --    --   22   ALT   --    --   21   INR  1.1   --    --      No results found for: GLUCPOC  No results for input(s): PH, PCO2, PO2, HCO3, FIO2 in the last 72 hours. Recent Labs      08/27/18   0327   INR  1.1     All Micro Results     Procedure Component Value Units Date/Time    URINE CULTURE HOLD SAMPLE [[id number]] Collected:  08/25/18 1418    Order Status:  Completed Specimen:  Urine from Serum Updated:  08/25/18 1428     Urine culture hold         URINE ON HOLD IN MICROBIOLOGY DEPT FOR 3 DAYS. IF UNPRESERVED URINE IS SUBMITTED, IT CANNOT BE USED FOR ADDITIONAL TESTING AFTER 24 HRS, RECOLLECTION WILL BE REQUIRED. I have reviewed notes of prior 24hr. Other pertinent lab:       Total time spent with patient: 30 [address] discussed with: Patient, Family, Nursing Staff, Consultant/Specialist and >50% of time spent in counseling and coordination of care    Discussed:  Care Plan    Prophylaxis:  Lovenox    Disposition:  Home w/Family           ___________________________________________________    Attending Physician: Ryan Yang MD

## 2018-08-27 NOTE — PROGRESS NOTES
[facility]  INTENSIVIST DAILY PROGRESS NOTE  Name: Teri Vargas   : 1929   MRN: [id number]   Date: 2018 8:27 AM     Interval history:  Dual chamber pacemaker placed today. He has no complaints for me this morning. ROS: more than 10 systems covered, unrarkable    Vital Signs:    Visit Vitals    /63    Pulse 67    Temp 98 °F (36.7 °C)    Resp 12    Ht 5' 8.5\" (1.74 m)    Wt 62.8 kg (138 lb 7.2 oz)    SpO2 96%    BMI 20.74 kg/m2       O2 Device: Room air       Temp (24hrs), Av.8 °F (36.6 °C), Min:97.4 °F (36.3 °C), Max:98.2 °F (36.8 °C)       Intake/Output:   Last shift:       07 -  1900  In: 100 [I.V.:100]  Out: -   Last 3 shifts:  190 -  0700  In: 930 [P.O.:930]  Out: 3200 [Urine:3200]    Intake/Output Summary (Last 24 hours) at 18 0940  Last data filed at 18 0800   Gross per 24 hour   Intake              580 ml   Output             2000 ml   Net            -1420 ml                Physical Exam:  General:  Alert, cooperative, no distress, appears stated age. Head:  Normocephalic, without obvious abnormality, atraumatic. Eyes:  Conjunctivae/corneas clear. PERRL, EOMs intact. Nose: Nares normal. Septum midline. Mucosa normal. No drainage or sinus tenderness. Throat: Lips, mucosa, and tongue normal. Teeth and gums normal.   Neck: Supple, symmetrical, trachea midline, no adenopathy, thyroid: no enlargment/tenderness/nodules, no carotid bruit and no JVD. Back:   Symmetric, no curvature. ROM normal.   Lungs:   Clear to auscultation bilaterally. Chest wall:  No tenderness or deformity. Heart:  Regular rate and rhythm, S1, S2 normal, no murmur, click, rub or gallop. Abdomen:   Soft, non-tender. Bowel sounds normal. No masses,  No organomegaly. Extremities: Extremities normal, atraumatic, no cyanosis or edema. Pulses: 2+ and symmetric all extremities.    Skin: Skin color, texture, turgor normal. No rashes or lesions   Lymph nodes: Cervical, supraclavicular, and axillary nodes normal.   Neurologic: Grossly nonfocal       DATA:   Current Facility-Administered Medications   Medication Dose Route Frequency    ondansetron (ZOFRAN) injection 4 mg  4 mg IntraVENous PRN    sodium chloride (NS) flush 5-10 mL  5-10 mL IntraVENous Q8H    sodium chloride (NS) flush 5-10 mL  5-10 mL IntraVENous PRN    HYDROcodone-acetaminophen (NORCO) 5-325 mg per tablet 1 Tab  1 Tab Oral Q4H PRN    ondansetron (ZOFRAN) injection 4 mg  4 mg IntraVENous Q4H PRN    ceFAZolin (ANCEF) 1 g in 0.9% sodium chloride (MBP/ADV) 50 mL  1 g IntraVENous Q8H    hydrALAZINE (APRESOLINE) 20 mg/mL injection 10 mg  10 mg IntraVENous Q6H PRN    atropine injection 1 mg  1 mg IntraVENous PRN    sodium chloride (NS) flush 5-10 mL  5-10 mL IntraVENous Q8H    sodium chloride (NS) flush 5-10 mL  5-10 mL IntraVENous PRN    acetaminophen (TYLENOL) tablet 650 mg  650 mg Oral Q4H PRN    enoxaparin (LOVENOX) injection 40 mg  40 mg SubCUTAneous Q24H    latanoprost (XALATAN) 0.005 % ophthalmic solution 1 Drop  1 Drop Both Eyes QHS       Telemetry:          Labs:  Recent Labs      08/27/18   0327  08/26/18   0356  08/25/18   1418   WBC  8.0  7.4  8.1   HGB  14.2  14.0  13.4   HCT  42.0  42.0  40.1   PLT  215  207  209     Recent Labs      08/27/18   0327  08/26/18   0356  08/25/18   1418   NA  141  143  141   K  3.7  3.6  4.0   CL  106  107  104   CO2  28  28  28   GLU  94  94  145*   BUN  15  14  15   CREA  0.96  0.93  1.14   CA  8.4*  8.4*  8.6   MG   --   2.1  2.2   PHOS   --   3.0   --    ALB   --    --   3.7   SGOT   --    --   22   ALT   --    --   21   INR  1.1   --    --      No results for input(s): PH, PCO2, PO2, HCO3, FIO2 in the last 72 hours.       IMPRESSION:   · High grade AV block  · Syncopy/near syncopy   PLAN:   · S/P pacemaker placement today  · Cardiology, EP following  · Tanvi-procedure abx as per Dr. Elvira Floyd  · Lovenox  · Cardiac diet    Stable for transfer out of the ICU     My assessment/plan was discussed with:nursing    Thad Davila MD

## 2018-08-27 NOTE — ADVANCED PRACTICE NURSE
Failure to capture noted on telemetry. PPM programmed to MVP  Concern for lead displacement  Will ck chest xray to eval leads. Xray shows intact leads. PPM reprogrammed to from AAI -DDD to DDD     D/W pt and son.

## 2018-08-27 NOTE — PROGRESS NOTES
1900 Bedside and Verbal shift change report received from Rudolph Burger RN (offgoing nurse) by Wil Arriaza RN (oncoming nurse). Report included the following information SBAR, Kardex, Intake/Output, MAR, Accordion, Recent Results, Cardiac Rhythm SR with episodes of CHB and Alarm Parameters . 2100 Pt tracy juice but refused offered HS snack. Monitor shows SR with freq PACs and occ 2nd degree HB.  0000 Pt verbalized understanding of NPO  0100 Pt awake and noted confused and disoriented, states that he \"has to get OOB to go out for the surgery\". Calmed and easily redirected after being reoriented to location and POC.  0315 Am labs drawn and sent. Pt remains pleasantly confused but easily reorients. 0500 Pre procedure checklist completed. 0645 Pt transported to EPS by Larissa Bishop RN, bedside report given. 0700  Bedside and Verbal shift change report given to Xochitl Keller RN (oncoming nurse) by Wil Arriaza RN (offgoing nurse). Report included the following information SBAR, Kardex, Intake/Output, MAR, Accordion, Recent Results, Cardiac Rhythm SR with frequent periods of verying degrees of heartblock including CHB, and Alarm Parameters .

## 2018-08-27 NOTE — PROGRESS NOTES
0715: Verbal shift change report given to Leatha Vazquez, RN (oncoming nurse) by Ugo Plummer RN  (offgoing nurse). Report included the following information SBAR, Kardex, Intake/Output and MAR. Patient down for pacemaker placement. Will await report. 0800: Report called from EP lab by Mary Rock. Patient received back to unit accompanied by x2 RNs. Patient alert, confused as to date at this time. Slightly drowsy from procedure. Family came to bedside to greet patient. Patient instructed not to lift left arm at this time, sling applied. 1159: Justine Fitzgerald NP notified of rhythm changes on patient's monitoring. Wide QRS complex, failure to capture. EKG ordered and done. Shown to Jan Bonds NP. Patient asymptomatic at this time. 1352: Patient's pacer interrogated by Mahesh Coelho. Leads in place. Settings changed by MD. Patient now pacing well. Cardiology aware of frequent PVCs. 1630: Spoke w/ Justine Fitzgerald about telemetry's concern that pacer is pacing in QRS. She consulted with Dr. Lonnie Duran and concluded it is a monitoring issue, not within the pacer. 1900: Bedside shift change report given to Marco Rose (oncoming nurse) by Alvarado Andujar RN (offgoing nurse). Report included the following information SBAR, Kardex, Intake/Output and MAR.

## 2018-08-27 NOTE — PROGRESS NOTES
Reason for Admission:   syncope                   RRAT Score:          0           Plan for utilizing home health:      PT will be assessing pt tomorrow and will detemine if pt nees home PT. Pt is hopeful that he will not need home health. Likelihood of Readmission:  Low/green                         Transition of Care Plan:                    I met with pt.his wife and son. Pt and his wife live together @ the address on demographics. Wife is [place]. Her cell number is [phone]. Son is Mitch Lay. His cell number is [phone]. Pt lives in a one story joanne. There are 5 entry steps inside from their back entry which they typically use. In the front,there are 2 entry steps pt & his wife use \"in emergencies\". Wife states \"we have strong railings\"  Typically,pt ambulates with no assistive devices. Pt does not anticipate any home health needs. I explained to pt that tomorrow,PT will assess him and if they recommend home health ,case management will discuss their recommendation with him. Neither pt or his wife have any preferences for home health. In fact,pt is really hoping that no home health will be prescribed. Pt has only seen Dr Brandy Ford for a tetanus shot after stepping on a sophia nail. His wife stated \"I go see Dr Mary Gillespie and this is who he will go to if absolutely necessary. \"   When discharged,pt will follow-up with Dr Yury Gayle . I will continue to follow pt for discharge needs.     Suly Sharpe

## 2018-08-27 NOTE — PROGRESS NOTES
Problem: Falls - Risk of  Goal: *Absence of Falls  Document Tona Fall Risk and appropriate interventions in the flowsheet. Outcome: Progressing Towards Goal  Fall Risk Interventions:       Mentation Interventions: Adequate sleep, hydration, pain control, Door open when patient unattended, Eyeglasses and hearing aids, Reorient patient, Update white board    Medication Interventions: Bed/chair exit alarm, Evaluate medications/consider consulting pharmacy, Patient to call before getting OOB, Teach patient to arise slowly    Elimination Interventions: Bed/chair exit alarm, Call light in reach, Patient to call for help with toileting needs, Toileting schedule/hourly rounds, Urinal in reach             Problem: Arrhythmia Pathway (Adult)  Goal: *Absence of arrhythmia  Outcome: Progressing Towards Goal  Pt continues to have asymptomatic self limitedarrhythmias including varying degrees of heartblock and WAP. Problem: Anxiety  Goal: *Alleviation of anxiety  Outcome: Progressing Towards Goal  Decreased anxiety noted after verbalization of understanding rationale of POC. Problem: Pacer/ICD: Pre-Procedure  Goal: Treatments/Interventions/Procedures  Outcome: Progressing Towards Goal  #18 gauge IV access X2 in place and consent on chart  Pt and wife verbalized description of procedure and understanding POC. Pt NPO after MN for permanent pacemaker insertion at 0700 on 8/27.

## 2018-08-27 NOTE — PROGRESS NOTES
Marcus Gonsalez MD [facility] [address]   Office [phone]  Mobile [phone]            NAME:  Ladarius Kim   :   1929   MRN:   [id number]     Assessment/Plan:   1. Intermittent high grade AVB with recent syncope: s/p dual chamber PPM this am . Cont IV abx, chest xray tonight. Device interrogation in am. PT/OT eval in am.   2. Elevated BP without antecedent HTN, stable       Anticipate d/c in am        Subjective:   Cardiac ROS: Patient denies any exertional chest pain, dyspnea, palpitations, syncope, orthopnea, edema or paroxysmal nocturnal dyspnea. Review of Systems: No nausea, indigestion, vomiting, pain, cough, sputum. No bleeding. Taking po. Appetite good. Objective:     Visit Vitals    /64    Pulse 74    Temp 98 °F (36.7 °C)    Resp 19    Ht 5' 8.5\" (1.74 m)    Wt 138 lb 7.2 oz (62.8 kg)    SpO2 97%    BMI 20.74 kg/m2      O2 Device: Room air    Temp (24hrs), Av.8 °F (36.6 °C), Min:97.4 °F (36.3 °C), Max:98.2 °F (36.8 °C)       07 -  1900  In: 100 [I.V.:100]  Out: -      190 -  0700  In: 80 [P.O.:930]  Out: 3200 [Urine:3200]    TELE: sinus, bursts of 2nd degree/3rd degree AVB SR post PPM 80    General: AAOx3 cooperative, no acute distress. [place]   HEENT: Atraumatic. Pink and moist.  Anicteric sclerae. Neck : Supple, no thyromegaly. Lungs: CTA bilaterally. No wheezing/rhonchi/rales. Heart: L infraclavicular space dsg D/I. L arm sling on. Regular rhythm, no murmur. No JVD. No carotid bruits. Abdomen: Soft, non-distended, non-tender. + Bowel sounds. No bruits. Extremities: No edema, no clubbing, no cyanosis. No calf tenderness  Neurologic: Grossly intact. Alert and oriented X 3. No acute neurological distress. Psych: Good insight. Not anxious or agitated.       Care Plan discussed with:    Comments   Patient x    Family  x    RN x    Care Manager x                   Consultant:  x        Data Review: ECHO 8/26/18 LVEF 55-60% no WMA  8/27/18 Medtronic dual chamber PPM      No results for input(s): TNIPOC in the last 72 hours.     No lab exists for component: ITNL   Recent Labs      08/26/18   0356  08/25/18   1418   TROIQ  <0.05  <0.05     Recent Labs      08/27/18   0327  08/26/18   0356  08/25/18   1418   NA  141  143  141   K  3.7  3.6  4.0   CL  106  107  104   CO2  28  28  28   BUN  15  14  15   CREA  0.96  0.93  1.14   GLU  94  94  145*   PHOS   --   3.0   --    MG   --   2.1  2.2   ALB   --    --   3.7   WBC  8.0  7.4  8.1   HGB  14.2  14.0  13.4   HCT  42.0  42.0  40.1   PLT  215  207  209     Recent Labs      08/27/18   0327   INR  1.1   PTP  10.8   APTT  27.0       Medications reviewed  Current Facility-Administered Medications   Medication Dose Route Frequency    hydrALAZINE (APRESOLINE) 20 mg/mL injection 10 mg  10 mg IntraVENous Q6H PRN    atropine injection 1 mg  1 mg IntraVENous PRN    sodium chloride (NS) flush 5-10 mL  5-10 mL IntraVENous Q8H    sodium chloride (NS) flush 5-10 mL  5-10 mL IntraVENous PRN    acetaminophen (TYLENOL) tablet 650 mg  650 mg Oral Q4H PRN    enoxaparin (LOVENOX) injection 40 mg  40 mg SubCUTAneous Q24H    latanoprost (XALATAN) 0.005 % ophthalmic solution 1 Drop  1 Drop Both Eyes QHS         Sandra Ordoñez NP

## 2018-08-27 NOTE — PROCEDURES
Cardiac Electrophysiology Report      PATIENT INFORMATION     Patient Name: Alex Adame  MRN: [id number]                  Study Date: 2018    YOB: 1929   Age: 80 y.o. Gender: male      Procedure:  Marita Barrera    Referring Physician:  Dr. Abad Wright     Duty Name   Electrophysiologist Raina Douglass MD    [facility], RN; Beverly Venegas RN   Circulator Svitlana Vinton, RT       PATIENT HISTORY     80year old male hospitalized with syncope and found to have intermittent high degree AV block that was irreversible. Echocardiogram demonstrated preserved LV function. He now presents for implantation of a dual-chamber pacemaker. PROCEDURE     The patient was brought to the Cardiac Electrophysiology laboratory in a post-absorptive, fasting state. Informed consent was obtained. A peripheral IV was in place. Continuous electrocardiographic, blood pressure, O2 saturation and  CO2 monitoring was initiated. Intravenous antibiotics were administered pre-operatively. Self-adhesive cardioversion patches were positioned on the chest.  Conscious sedation was effectuated according to protocol. The patient was then prepped and draped in the usual sterile fashion. A left subclavian venogram was performed and demonstrated patency of the vein. A 50/50 mixture of lidocaine (1%) with epinephrine and bupivicaine (0.5%) was utilized for local anesthesia. An incision was performed medial to the left delto-pectoral groove. Sharp and blunt dissection was carried down to the level of the pre-pectoralis fascia. Hemostasis was maintained with electrocautery. Extra-thoracic, subclavian venous access was obtained twice and sheaths were placed using the modified Seldinger technique.  Permanent pace/sense leads were advanced under fluoroscopic guidance and positioned in the right atrium and ventricle where stable pacing and sensing characteristics were encountered. The sheaths were peeled away and the leads were anchored to the pre-pectoralis fascia using O-ethibond non-absorbable suture material. A device pocket was then fashioned and flushed copiously with antibiotic solution. A pacemaker generator was connected to the leads and the generator was placed into the pocket. The device was secured to the pocket using O-ethibond, non-absorbable suture material. The pocket was then closed in three layers using 2-O vicryl, 3-O monocryl absorbable suture material and a zipline. The skin was closed using a sub-cuticular technique. A bio-occlusive dressing were applied to the skin. The patient remained hemodynamically stable, tolerated the procedure well and was transferred in stable condition. There were no immediate complications encountered during the procedure. There was minimal blood loss and no specimen were removed. LEAD & GENERATOR DATA       Model # Serial #   Generator Medtronic [id number]   Atrial Lead Medtronic 2161 [id number]   Ventricular Lead Medtronic 7100 [id number]       PACE/SENSE DATA      Sensed Wave (mV) Threshold (V) Impedance (Ohms)   Atrium 2.6 1.5 920   Ventricle 10.8 1.0 977       FINAL PROGRAMMING     Mode Lower Rate (ppm) Upper Rate (ppm)   AAI-DDD 60 130       MEDICATION SUMMARY     Medication Route Unit Total   Gentamycin IV mg 80   Ancef IV grams 2   Fentanyl IV micrograms 25   Versed IV grams 2       RADIOLOGY SUMMARY      Total   Fluoro Time (minutes) 4.7      Dose Area Product (mGy) 34       CONCLUSIONS     1. Successful implantation of a dual-chamber pacemaker. 2. IV antibiotics x 24 hours for 3 doses followed by Keflex 500 mg po tid x 5 days. 3. Monitor overnight on telemetry. 4. CXR (PA & lateral) and device interrogation in AM.  5. Possible discharge in AM.  6. Wound check in EP clinic in 10 days. 7. Follow up in EP clinic in 1 month or earlier if necessary. 8. Follow up with Dr. Dalia Gonzales as scheduled. Thank you, Dr. Josi Foster for involving me in the care of this extraordinarily pleasant male.        Dakota Ahmadi MD  Cardiac Electrophysiology / Cardiology    [address]  [address] [facility], [address] [facility] [address]  [address]  [phone] / [phone] Fax      [phone] / [phone] Fax

## 2018-08-28 VITALS
RESPIRATION RATE: 20 BRPM | SYSTOLIC BLOOD PRESSURE: 143 MMHG | HEART RATE: 89 BPM | HEIGHT: 69 IN | WEIGHT: 138.45 LBS | OXYGEN SATURATION: 95 % | TEMPERATURE: 98.8 F | DIASTOLIC BLOOD PRESSURE: 77 MMHG | BODY MASS INDEX: 20.51 KG/M2

## 2018-08-28 PROBLEM — R55 SYNCOPE AND COLLAPSE: Status: RESOLVED | Noted: 2018-08-25 | Resolved: 2018-08-28

## 2018-08-28 LAB
ATRIAL RATE: 102 BPM
ATRIAL RATE: 59 BPM
CALCULATED P AXIS, ECG09: 74 DEGREES
CALCULATED P AXIS, ECG09: 75 DEGREES
CALCULATED R AXIS, ECG10: 67 DEGREES
CALCULATED R AXIS, ECG10: 75 DEGREES
CALCULATED T AXIS, ECG11: 61 DEGREES
CALCULATED T AXIS, ECG11: 61 DEGREES
DIAGNOSIS, 93000: NORMAL
DIAGNOSIS, 93000: NORMAL
P-R INTERVAL, ECG05: 158 MS
P-R INTERVAL, ECG05: 186 MS
Q-T INTERVAL, ECG07: 390 MS
Q-T INTERVAL, ECG07: 418 MS
QRS DURATION, ECG06: 124 MS
QRS DURATION, ECG06: 132 MS
QTC CALCULATION (BEZET), ECG08: 413 MS
QTC CALCULATION (BEZET), ECG08: 508 MS
VENTRICULAR RATE, ECG03: 102 BPM
VENTRICULAR RATE, ECG03: 59 BPM

## 2018-08-28 PROCEDURE — 74011250637 HC RX REV CODE- 250/637: Performed by: NURSE PRACTITIONER

## 2018-08-28 PROCEDURE — 97116 GAIT TRAINING THERAPY: CPT

## 2018-08-28 PROCEDURE — 97161 PT EVAL LOW COMPLEX 20 MIN: CPT

## 2018-08-28 PROCEDURE — 74011250636 HC RX REV CODE- 250/636: Performed by: INTERNAL MEDICINE

## 2018-08-28 RX ORDER — CEPHALEXIN 500 MG/1
500 CAPSULE ORAL 3 TIMES DAILY
Qty: 15 CAP | Refills: 0 | Status: SHIPPED | OUTPATIENT
Start: 2018-08-28 | End: 2018-09-02

## 2018-08-28 RX ADMIN — Medication 2 G: at 06:51

## 2018-08-28 RX ADMIN — CEPHALEXIN 500 MG: 250 CAPSULE ORAL at 10:07

## 2018-08-28 NOTE — PROGRESS NOTES
Pacemaker education completed. I have recontacted PT requesting completion of their assessment.  
Francisca Zhang

## 2018-08-28 NOTE — PROGRESS NOTES
Bedside and Verbal shift change report given to Suyapa Mendes RN (oncoming nurse) by Alfonso Perez RN (offgoing nurse). Report included the following information SBAR, Kardex, ED Summary, OR Summary, Procedure Summary, Intake/Output, MAR and Recent Results. Primary Nurse Estuardo Dove RN and Alfonso Perez RN, RN performed a dual skin assessment on this patient No impairment noted  Mundo score is 19. Patient had pacemaker placed today covered in aquacell to left shoulder. Pacemaker rep came and interrogated pacer today and made changes per day shift nurse. Patient assisted to radiology with nurse and monitor for CXR PA and lateral at change of shift. Patient is AAO not in pain. Incontinent of urine. Patient given full bath and linen change.

## 2018-08-28 NOTE — PROGRESS NOTES
0700- Bedside and Verbal shift change report given to Yasmany Chilel RN (oncoming nurse) by Leyda Tariq RN (offgoing nurse). Report included the following information SBAR, Intake/Output and Cardiac Rhythm paced. 1142- Discharge instructions and education with prescription information given to Pt and son. Pt verbalized understanding and son agreed. Pt's VSS. Pt alert and oriented. Pt's IVs removed. Pt assisted to son's car via wheelchair. Pt has appointment with EP. Pt educated on home care of pacemaker.

## 2018-08-28 NOTE — PROGRESS NOTES
Cardiology appointment on Friday September 7,2018 @ 1:00 p.m. Added to AVS. Suzanna Love CM  Specialist

## 2018-08-28 NOTE — DISCHARGE SUMMARY
Hospitalist Discharge Summary     Patient ID:    Efe Olivera  [id number]  35 y.o.  7/16/1929    Admit date: 8/25/2018    Discharge date and time: 8/28/2018    Admission Diagnoses: Symptomatic bradycardia    Chronic Diagnoses:    Problem List as of 8/28/2018  Date Reviewed: 8/28/2018          Codes Class Noted - Resolved    Symptomatic bradycardia ICD-10-CM: R00.1  ICD-9-CM: 427.89  8/25/2018 - Present        Elevated blood pressure reading without diagnosis of hypertension ICD-10-CM: R03.0  ICD-9-CM: 796.2  8/25/2018 - Present        Right bundle branch block ICD-10-CM: I45.10  ICD-9-CM: 426.4  8/25/2018 - Present        * (Principal)RESOLVED: Syncope and collapse ICD-10-CM: R55  ICD-9-CM: 780.2  8/25/2018 - 8/28/2018              Discharge Medications:   Current Discharge Medication List      START taking these medications    Details   cephALEXin (KEFLEX) 500 mg capsule Take 1 Cap by mouth three (3) times daily for 5 days. Qty: 15 Cap, Refills: 0         CONTINUE these medications which have NOT CHANGED    Details   latanoprost (XALATAN) 0.005 % ophthalmic solution Administer 1 Drop to both eyes nightly. Follow up Care:    1. Primary care physician 1-2 weeks  2. Cardiology     Diet:  Cardiac Diet    Disposition:  Home. Advanced Directive:    Discharge Exam:  See today's note.     CONSULTATIONS: Cardiology    Significant Diagnostic Studies:   Recent Labs      08/27/18   0327  08/26/18   0356   WBC  8.0  7.4   HGB  14.2  14.0   HCT  42.0  42.0   PLT  215  207     Recent Labs      08/27/18   0327  08/26/18   0356  08/25/18   1418   NA  141  143  141   K  3.7  3.6  4.0   CL  106  107  104   CO2  28  28  28   BUN  15  14  15   CREA  0.96  0.93  1.14   GLU  94  94  145*   CA  8.4*  8.4*  8.6   MG   --   2.1  2.2   PHOS   --   3.0   --      Recent Labs      08/25/18   1418   SGOT  22   ALT  21   AP  59   TBILI  1.3*   TP  7.2   ALB  3.7   GLOB  3.5     Recent Labs      08/27/18   0327   INR  1.1   PTP 10. 8   APTT  27.0      No results for input(s): FE, TIBC, PSAT, FERR in the last 72 hours. No results for input(s): PH, PCO2, PO2 in the last 72 hours. No results for input(s): CPK, CKMB in the last 72 hours. No lab exists for component: TROPONINI  No results found for: [address]:   1. 1.  Syncope and collapse (8/25/2018)/Symptomatic bradycardia (8/25/2018)/3rd degree heart block/Right bundle branch block (8/25/2018). S/p pacemaker on 8/27. Cardiology FU      2. Elevated blood pressure reading without diagnosis of hypertension (8/25/2018). BP stable currently.  outpatient FU     discharged in improved condition       Signed:  Adi Chen MD  8/28/2018  8:33 AM

## 2018-08-28 NOTE — PROGRESS NOTES
Pharmacist Discharge Medication Reconciliation Discharge Provider:  Dr. Elizondo Sep Discharge Medications: My Medications START taking these medications Instructions Each Dose to Equal   Morning Noon Evening Bedtime  
 
 cephALEXin 500 mg capsule Commonly known as:  Mary Ann Shen Your last dose was: Your next dose is: Take 1 Cap by mouth three (3) times daily for 5 days. 500 mg CONTINUE taking these medications Instructions Each Dose to Equal   Morning Noon Evening Bedtime  
 
 latanoprost 0.005 % ophthalmic solution Commonly known as:  Nicholas Jumbo Your last dose was: Your next dose is:    
   
   
 Administer 1 Drop to both eyes nightly. 1 Drop Where to Get Your Medications These medications were sent to [facility] [address] [facility] [address] [place] [address], [facility] [address],  [address] [place] [address] Phone:  [phone]  cephALEXin 500 mg capsule The patient's chart, MAR, and AVS were reviewed by Angelica Rivera, HugoD Contact: [phone]

## 2018-08-28 NOTE — PROGRESS NOTES
Problem: Falls - Risk of  Goal: *Absence of Falls  Document Tona Fall Risk and appropriate interventions in the flowsheet.    Fall Risk Interventions:       Mentation Interventions: Adequate sleep, hydration, pain control, Bed/chair exit alarm, Door open when patient unattended, More frequent rounding, Reorient patient, Room close to nurse's station    Medication Interventions: Assess postural VS orthostatic hypotension, Bed/chair exit alarm, Evaluate medications/consider consulting pharmacy    Elimination Interventions: Bed/chair exit alarm, Call light in reach, Elevated toilet seat, Patient to call for help with toileting needs

## 2018-08-28 NOTE — PROGRESS NOTES
physical Therapy EVALUATION/DISCHARGE Patient: Eliza Joseph (1 y.o. male) Date: 8/28/2018 Primary Diagnosis: Symptomatic bradycardia Precautions:   Fall (pacemaker) ASSESSMENT : 
Based on the objective data described below, the patient presents with decreased balance following admission initially for syncope on 8/25/18. Patient has undergone full cardiac work-up and is now POD #1 pacemaker placement. Prior to admission patient was fully independent, very active despite his age, and still driving. No previous fall history. Today patient received supine in bed. Has not been out of bed since admission. Patient tolerated session with vitals stable- see below. Patient did demonstrate path deviations but demonstrates self-recovery and no overt loss of balance. Patient able to ambulate 250 feet with no assistive device, able to transverse 4 steps with 1 hand rail to mimic home set up all at a CGA level. All other transfers are MOD I- Supervision. Patient's son will be staying with patient with return to home and recommend supervision assistance with return to home which he and wife can provide. Patient prior to mobilizing was educated on all pacemaker precautions, sling wear and precautions, and he verbalized understanding. Patient is cleared to discharge home, no follow up PT needs identified. [facility] Skilled physical therapy is not indicated at this time. PLAN : 
Discharge Recommendations: None Further Equipment Recommendations for Discharge: none SUBJECTIVE:  
Patient stated i have not moved in 4 days.  OBJECTIVE DATA SUMMARY:  
HISTORY:   
No past medical history on file. No past surgical history on file. Prior Level of Function/Home Situation: see above Personal factors and/or comorbidities impacting plan of care:  
 
Home Situation Home Environment: Private residence # Steps to Enter: 4 Rails to Enter: Yes Hand Rails : Right One/Two Story Residence: One story Living Alone: Yes Support Systems: Spouse/Significant Other/Partner Patient Expects to be Discharged to[de-identified] Private residence Current DME Used/Available at Home: None EXAMINATION/PRESENTATION/DECISION MAKING:  
Critical Behavior: 
Neurologic State: Alert, Appropriate for age Orientation Level: Oriented X4 Cognition: Appropriate decision making, Appropriate for age attention/concentration, Appropriate safety awareness Hearing: Auditory Auditory Impairment: Hard of hearing, bilateral 
Hearing Aids/Status: Left Skin:  All exposed intact Edema: none noted Range Of Motion: 
AROM: Within functional limits PROM: Within functional limits Strength:   
Strength: Within functional limits Tone & Sensation:  
Tone: Normal 
  
  
  
  
Sensation: Intact Coordination: 
Coordination: Within functional limits Vision:  
  
Functional Mobility: 
Bed Mobility: 
Rolling: Independent Supine to Sit: Independent Sit to Supine: Independent Transfers: 
Sit to Stand: Modified independent Stand to Sit: Modified independent Bed to Chair: Supervision Balance:  
Sitting: Intact Standing: Intact Ambulation/Gait Training: 
Distance (ft): 250 Feet (ft) Assistive Device: Gait belt Ambulation - Level of Assistance: Supervision Gait Description (WDL): Exceptions to [facility] Gait Abnormalities: Path deviations Stairs: 
Number of Stairs Trained: 4 Stairs - Level of Assistance: Contact guard assistance Rail Use: Right Therapeutic Exercises:  
 
 
Functional Measure: 
Tinetti test: 
 
Sitting Balance: 1 Arises: 1 Attempts to Rise: 2 Immediate Standing Balance: 2 Standing Balance: 2 Nudged: 1 Eyes Closed: 1 Turn 360 Degrees - Continuous/Discontinuous: 1 Turn 360 Degrees - Steady/Unsteady: 0 Sitting Down: 2 Balance Score: 13 Indication of Gait: 1 
R Step Length/Height: 1 L Step Length/Height: 1 R Foot Clearance: 1 L Foot Clearance: 1 Step Symmetry: 1 Step Continuity: 1 Path: 2 Trunk: 2 Walking Time: 1 Gait Score: 12 Total Score: 25 Tinetti Test and G-code impairment scale: 
Percentage of Impairment CH 
 
0% 
 CI 
 
1-19% CJ 
 
20-39% CK 
 
40-59% CL 
 
60-79% CM 
 
80-99% CN  
 
100% Tinetti Score 0-28 28 23-27 17-22 12-16 6-11 1-5 0 Tinetti Tool Score Risk of Falls 
<19 = High Fall Risk 19-24 = Moderate Fall Risk 25-28 = Low Fall Risk Tinetti ME. Performance-Oriented Assessment of Mobility Problems in Elderly Patients. [address]; [id number]. (Scoring Description: PT Bulletin Feb. 10, 1993) Older adults: Yanique Atwood et al, 2009; n = [address] [facility] elderly evaluated with ABC, RAMA, ADL, and IADL) · Mean RAMA score for males aged 69-68 years = 26.21(3.40) · Mean RAMA score for females age 69-68 years = 25.16(4.30) · Mean RAMA score for males over 80 years = 23.29(6.02) · Mean RAMA score for females over 80 years = 17.20(8.32) G codes: In compliance with CMSs Claims Based Outcome Reporting, the following G-code set was chosen for this patient based on their primary functional limitation being treated: The outcome measure chosen to determine the severity of the functional limitation was the Tinetti with a score of 25/28 which was correlated with the impairment scale. ? Mobility - Walking and Moving Around:  
  - CURRENT STATUS: CI - 1%-19% impaired, limited or restricted  - GOAL STATUS: CI - 1%-19% impaired, limited or restricted  - D/C STATUS:  CI - 1%-19% impaired, limited or restricted Physical Therapy Evaluation Charge Determination History Examination Presentation Decision-Making MEDIUM  Complexity : 1-2 comorbidities / personal factors will impact the outcome/ POC  LOW Complexity : 1-2 Standardized tests and measures addressing body structure, function, activity limitation and / or participation in recreation  LOW Complexity : Stable, uncomplicated  Other outcome measures Barthel Index  LOW Based on the above components, the patient evaluation is determined to be of the following complexity level: LOW Pain: 
Pain Scale 1: Numeric (0 - 10) Pain Intensity 1: 0 Activity Tolerance: No complications with upright activity Please refer to the flowsheet for vital signs taken during this treatment. After treatment:  
[]   Patient left in no apparent distress sitting up in chair 
[]   Patient left in no apparent distress in bed 
[]   Call bell left within reach 
[]   Nursing notified 
[]   Caregiver present 
[]   Bed alarm activated COMMUNICATION/EDUCATION:  
Communication/Collaboration: 
[]   Fall prevention education was provided and the patient/caregiver indicated understanding. []   Patient/family have participated as able and agree with findings and recommendations. []   Patient is unable to participate in plan of care at this time. Findings and recommendations were discussed with: Registered Nurse Thank you for this referral. 
Luís Quezada, PT, DPT Time Calculation: 17 mins

## 2018-08-28 NOTE — PROGRESS NOTES
Pt had his pacemaker checked this am.The technician will return around 9 am to provide education for pt & his family regarding his pacemaker. I contacted the therapy department with a request for an early PT session to determine any home health needs as pt is hoping for a 11 am discharge.     Raghavendra Mclean

## 2018-08-28 NOTE — DISCHARGE INSTRUCTIONS
ACUTE DIAGNOSES:  Symptomatic bradycardia    CHRONIC MEDICAL DIAGNOSES:  Problem List as of 8/28/2018  Date Reviewed: 8/28/2018          Codes Class Noted - Resolved    Symptomatic bradycardia ICD-10-CM: R00.1  ICD-9-CM: 427.89  8/25/2018 - Present        Elevated blood pressure reading without diagnosis of hypertension ICD-10-CM: R03.0  ICD-9-CM: 796.2  8/25/2018 - Present        Right bundle branch block ICD-10-CM: I45.10  ICD-9-CM: 426.4  8/25/2018 - Present        * (Principal)RESOLVED: Syncope and collapse ICD-10-CM: R55  ICD-9-CM: 780.2  8/25/2018 - 8/28/2018              DISCHARGE MEDICATIONS:          · It is important that you take the medication exactly as they are prescribed. · Keep your medication in the bottles provided by the pharmacist and keep a list of the medication names, dosages, and times to be taken in your wallet. · Do not take other medications without consulting your doctor. DIET:  Cardiac Diet    ACTIVITY: Activity as tolerated    ADDITIONAL INFORMATION: If you experience any of the following symptoms then please call your primary care physician or return to the emergency room if you cannot get hold of your doctor: Fever, chills, nausea, vomiting, diarrhea, change in mentation, falling, bleeding, shortness of breath. FOLLOW UP CARE:  Primary care physician. you are to call and set up an appointment to see them in 2 weeks. Follow-up with cardiology        Information obtained by :  I understand that if any problems occur once I am at home I am to contact my physician. I understand and acknowledge receipt of the instructions indicated above.                                                                                                                                            Physician's or R.N.'s Signature                                                                  Date/Time Patient or Representative Signature                                                          Date/Time            Pacemaker  Discharge Instructions    Please make sure you have received your Temporary Pacemaker identification card with your discharge instructions      MEDICATIONS         Take only the medications prescribed to you at discharge. ACTIVITY         Return to your normal activity, except as noted below. o Do not lift anything heavier than 10 pounds for 4 weeks with the affected arm. This is how long it takes the muscles to heal, and the leads inside your heart to stabilize their position. o Do not reach above your head with the affected arm for 4 weeks, doing so increases the risk of lead dislodgement.    o It is, however, important to move the affected arm to prevent shoulder stiffness and locking. o Avoid tight clothes or unnecessary pressure over your incision (such as bra straps or seat belts). If it is tender or sensitive to clothing, cover the incision with a soft dressing or pad.  o Questions about driving are individualized and should be discussed with one of the EP Physicians prior to discharge. SHOWERING         Leave the bandage over your incision for 10 days after the Pacemaker implant. You bandage will be removed in clinic in 10 days.  It is important to keep the bandaged area clean and dry. You may shower around the site until the bandage is removed in clinic. Thereafter, you may shower after the bandage is removed, washing it gently with soap and water. Do not apply any lotions, powders, or perfumes to the incision line.  Avoid submerging your incision in water (tub baths, hot tubs, or swimming) for four weeks.  Underneath the dressing. o If you have white steri-strips over your incision (underneath the gauze dressing), they will curl up at the end and fall off, usually within 10 days.   Do not pull them off.  - OR -   o You may have a different type of closure for the incision. If Dermabond Adhesive was used to close your incision, you will receive a separate instruction sheet. DISCHARGE PRECAUTIONS         Record your temperature every day, at the same time, for 3 weeks after your implant. A temperature of 100.5 F, or higher, can be the first sign of infection. This should be reported to your Doctor immediately.  You can have an MRI after 6 weeks. You must be aware that any strong magnet or magnetic field can affect your Pacemaker. In general, be careful of metal detectors, heavy machinery, and any area where arc-welding is performed. Avoid metal detectors such as the ones in security checkpoints at [facility] or [address] [place] [address]. When approaching a security checkpoint show your Pacemaker ID Card to security personnel and ask to be hand searched.  Always tell your doctor or dentist that you have a Pacemaker. Antibiotics may be prescribed before certain procedures.  If you use a cell phone, hold it on the opposite side from where your Pacemaker is implanted.  Your temporary identification will be given to you with these instructions. Keep your Pacemaker card in your wallet or on your person at all times. You should receive your permanent card in 8 weeks. If you do not receive your permanent card, please call the office at [phone]. TAKING YOUR PULSE         Take your pulse the same time every day, preferably in the morning.  Sit down and rest for 5 minutes prior to taking your pulse.  Take your pulse for 1 full minute, use a clock or stop watch with a second hand.  To feel your pulse, use the first two fingers of one hand; place them on the thumb side of the wrist of the opposite hand. The pulse will be steady, regular and throbbing.  Call the EP Lab Doctors if your pulse is less than 40 beats per minute.       SYMPTOMS THAT NEED TO BE REPORTED IMMEDIATELY  Temperature more than 100.4 F     Redness or warmth at the incision site, or pain for longer than the first 5 days after the implant.  Drainage from the incision site.  Swelling around the incision site.  Shortness of breath.  Rapid heart rate or palpitations.  Dizziness, lightheadedness, fainting.  Slow pulse below 40 beats per minute.  REMEMBER: If you feel something is an emergency or cannot be handled over the phone, call 911 or go to the closest emergency room.           Caleen Meigs, MD  Cardiac Electrophysiology / Cardiology    [address]  [address] [place] [address], [address] [facility], [address]  [address]  [phone] / [phone] Fax       [phone] / [phone] Fax

## 2018-08-28 NOTE — PROGRESS NOTES
Physical Therapy: 
Brief note, full note to follow. Patient is cleared to discharge home with family assist.  Vitals stable with upright activity. Instructed in all pacemaker precautions. No follow up therapy indicated. Thank you Reji Schaffer PT,DPT,NCS

## 2018-08-28 NOTE — PROGRESS NOTES
Cardiology Progress Note   [facility] [address]   Office [phone]             NAME:  Zoe Christianson   :   1929   MRN:   [id number]     Assessment/Plan:   1. 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 Device reprogrammed to PPM reprogrammed to from AAI -DDD to DDD . Chest xray w/o issue. PT/OT eval this am. Keflex 500 mg TID times 5 days as OP. Anticipate d/c today     Office will call with date/time of follow up. Subjective:   Cardiac ROS: Patient denies any exertional chest pain, dyspnea, palpitations, syncope, orthopnea, edema or paroxysmal nocturnal dyspnea. Review of Systems: No nausea, indigestion, vomiting, pain, cough, sputum. No bleeding. Taking po. Appetite good. Objective:     Visit Vitals    /79    Pulse 86    Temp 98.5 °F (36.9 °C)    Resp 17    Ht 5' 8.5\" (1.74 m)    Wt 138 lb 7.2 oz (62.8 kg)    SpO2 96%    BMI 20.74 kg/m2      O2 Device: Room air    Temp (24hrs), Av.5 °F (36.9 °C), Min:98.3 °F (36.8 °C), Max:98.6 °F (37 °C)            1901 -  0700  In: 120 [I.V.:120]  Out: 1850 [Urine:1850]    TELE: sinus, int paced    General: AAOx3 cooperative, no acute distress. Mentasta   HEENT: Atraumatic. Pink and moist.  Anicteric sclerae. Neck : Supple  Lungs: CTA bilaterally. No wheezing/rhonchi/rales. Heart: L infraclavicular space dsg D/I. L arm sling on. Regular rhythm, no murmur. No JVD. No carotid bruits. Abdomen: Soft, non-distended, non-tender. + Bowel sounds. No bruits. Extremities: No edema, no clubbing, no cyanosis. No calf tenderness  Neurologic: Grossly intact. Alert and oriented X 3. No acute neurological distress. Psych: Good insight. Not anxious or agitated.       Care Plan discussed with:    Comments   Patient x    Family  x Spouse, son   RN x    Care Manager x                   Consultant:  x attending       Data Review:     ECHO 18 LVEF 55-60% no WMA  8/27/18 Medtronic dual chamber PPM      No results for input(s): TNIPOC in the last 72 hours.     No lab exists for component: ITNL   Recent Labs      08/26/18   0356  08/25/18   1418   TROIQ  <0.05  <0.05     Recent Labs      08/27/18   0327  08/26/18   0356  08/25/18   1418   NA  141  143  141   K  3.7  3.6  4.0   CL  106  107  104   CO2  28  28  28   BUN  15  14  15   CREA  0.96  0.93  1.14   GLU  94  94  145*   PHOS   --   3.0   --    MG   --   2.1  2.2   ALB   --    --   3.7   WBC  8.0  7.4  8.1   HGB  14.2  14.0  13.4   HCT  42.0  42.0  40.1   PLT  215  207  209     Recent Labs      08/27/18 0327   INR  1.1   PTP  10.8   APTT  27.0       Medications reviewed  Current Facility-Administered Medications   Medication Dose Route Frequency    ondansetron (ZOFRAN) injection 4 mg  4 mg IntraVENous PRN    sodium chloride (NS) flush 5-10 mL  5-10 mL IntraVENous Q8H    sodium chloride (NS) flush 5-10 mL  5-10 mL IntraVENous PRN    HYDROcodone-acetaminophen (NORCO) 5-325 mg per tablet 1 Tab  1 Tab Oral Q4H PRN    ondansetron (ZOFRAN) injection 4 mg  4 mg IntraVENous Q4H PRN    cephALEXin (KEFLEX) capsule 500 mg  500 mg Oral TID    hydrALAZINE (APRESOLINE) 20 mg/mL injection 10 mg  10 mg IntraVENous Q6H PRN    atropine injection 1 mg  1 mg IntraVENous PRN    sodium chloride (NS) flush 5-10 mL  5-10 mL IntraVENous Q8H    sodium chloride (NS) flush 5-10 mL  5-10 mL IntraVENous PRN    acetaminophen (TYLENOL) tablet 650 mg  650 mg Oral Q4H PRN    enoxaparin (LOVENOX) injection 40 mg  40 mg SubCUTAneous Q24H    latanoprost (XALATAN) 0.005 % ophthalmic solution 1 Drop  1 Drop Both Eyes QHS         Paola Brewer NP

## 2018-08-28 NOTE — PROGRESS NOTES
Discharge note: I discussed pt with attending. I notified the pacemaker technician and educator that son has arrived so both pt and son can receive the education component together. I called the therapy department @ 8:11 am requesting an early PT assessment. Once PT has completed assessment,I will notify attending if PT recommends home health. Thank you.  
Nina Dimas

## 2018-08-28 NOTE — PROGRESS NOTES
[facility] [place] [facility]  [address] [place] [address], [address] [place] [address]  [phone]      Medical Progress Note      NAME: Kenya Rondon   :  1929  MRM:  [id number]    Date/Time: 2018  12:51 PM       Assessment and Plan:   1. Syncope and collapse (2018)/Symptomatic bradycardia (2018)/3rd degree heart block/Right bundle branch block (2018). S/p pacemaker on . Cardiology FU     2. Elevated blood pressure reading without diagnosis of hypertension (2018). BP stable currently. outpatient FU                                               Subjective:     Chief Complaint:  Follow up of pt who was admitted with syncope and AV block. Feels well. ROS:  (bold if positive, if negative)      Tolerating PT  Tolerating Diet        Objective:     Last 24hrs VS reviewed since prior progress note.  Most recent are:    Visit Vitals    /79    Pulse 86    Temp 98.8 °F (37.1 °C)    Resp 17    Ht 5' 8.5\" (1.74 m)    Wt 62.8 kg (138 lb 7.2 oz)    SpO2 96%    BMI 20.74 kg/m2     SpO2 Readings from Last 6 Encounters:   18 96%            Intake/Output Summary (Last 24 hours) at 18 1263  Last data filed at 18 1315   Gross per 24 hour   Intake               20 ml   Output                0 ml   Net               20 ml        Physical Exam:    Gen:  Well-developed, well-nourished, in no acute distress  HEENT:  Pink conjunctivae, PERRL, hearing intact to voice, moist mucous membranes  Neck:  Supple, without masses, thyroid non-tender  Resp:  No accessory muscle use, clear breath sounds without wheezes rales or rhonchi  Card:  No murmurs, normal S1, S2 without thrills, bruits or peripheral edema  Abd:  Soft, non-tender, non-distended, normoactive bowel sounds are present, no palpable organomegaly and no detectable hernias  Lymph:  No cervical or inguinal adenopathy  Musc:  No cyanosis or clubbing  Skin:  No rashes or ulcers, skin turgor is good  Neuro:  Cranial nerves are grossly intact, no focal motor weakness, follows commands appropriately  Psych:  Good insight, oriented to person, place and time, alert  __________________________________________________________________  Medications Reviewed: (see below)  Medications:     Current Facility-Administered Medications   Medication Dose Route Frequency    ondansetron (ZOFRAN) injection 4 mg  4 mg IntraVENous PRN    sodium chloride (NS) flush 5-10 mL  5-10 mL IntraVENous Q8H    sodium chloride (NS) flush 5-10 mL  5-10 mL IntraVENous PRN    HYDROcodone-acetaminophen (NORCO) 5-325 mg per tablet 1 Tab  1 Tab Oral Q4H PRN    ondansetron (ZOFRAN) injection 4 mg  4 mg IntraVENous Q4H PRN    cephALEXin (KEFLEX) capsule 500 mg  500 mg Oral TID    hydrALAZINE (APRESOLINE) 20 mg/mL injection 10 mg  10 mg IntraVENous Q6H PRN    atropine injection 1 mg  1 mg IntraVENous PRN    sodium chloride (NS) flush 5-10 mL  5-10 mL IntraVENous Q8H    sodium chloride (NS) flush 5-10 mL  5-10 mL IntraVENous PRN    acetaminophen (TYLENOL) tablet 650 mg  650 mg Oral Q4H PRN    enoxaparin (LOVENOX) injection 40 mg  40 mg SubCUTAneous Q24H    latanoprost (XALATAN) 0.005 % ophthalmic solution 1 Drop  1 Drop Both Eyes QHS        Lab Data Reviewed: (see below)  Lab Review:     Recent Labs      08/27/18 0327 08/26/18   0356  08/25/18   1418   WBC  8.0  7.4  8.1   HGB  14.2  14.0  13.4   HCT  42.0  42.0  40.1   PLT  215  207  209     Recent Labs      08/27/18 0327 08/26/18   0356  08/25/18   1418   NA  141  143  141   K  3.7  3.6  4.0   CL  106  107  104   CO2  28  28  28   GLU  94  94  145*   BUN  15  14  15   CREA  0.96  0.93  1.14   CA  8.4*  8.4*  8.6   MG   --   2.1  2.2   PHOS   --   3.0   --    ALB   --    --   3.7   TBILI   --    --   1.3*   SGOT   --    --   22   ALT   --    --   21   INR  1.1   --    --      No results found for: GLUCPOC  No results for input(s): PH, PCO2, PO2, HCO3, FIO2 in the last 72 hours.   Recent Labs 08/27/18   0327   INR  1.1     All Micro Results     Procedure Component Value Units Date/Time    URINE CULTURE HOLD SAMPLE [[id number]] Collected:  08/25/18 1418    Order Status:  Completed Specimen:  Urine from Serum Updated:  08/25/18 1428     Urine culture hold         URINE ON HOLD IN MICROBIOLOGY DEPT FOR 3 DAYS. IF UNPRESERVED URINE IS SUBMITTED, IT CANNOT BE USED FOR ADDITIONAL TESTING AFTER 24 HRS, RECOLLECTION WILL BE REQUIRED. I have reviewed notes of prior 24hr. Other pertinent lab:       Total time spent with patient: 30 895 [address] discussed with: Patient, Family, Nursing Staff, Consultant/Specialist and >50% of time spent in counseling and coordination of care    Discussed:  Care Plan    Prophylaxis:  Lovenox    Disposition:  Home w/Family           ___________________________________________________    Attending Physician: Keri Boo MD

## 2018-08-29 ENCOUNTER — TELEPHONE (OUTPATIENT)
Dept: CARDIOLOGY CLINIC | Age: 83
End: 2018-08-29

## 2018-08-29 NOTE — TELEPHONE ENCOUNTER
Pt wife Marsha Wade called with questions following her husbands recent procedure. Whether it is okay for patient to continue with vitamins, along with the antibiotics patient was prescribed following procedure.   Phone # [phone]  Thanks

## 2018-08-30 NOTE — TELEPHONE ENCOUNTER
Returned call, spoke with patient's wife Najma Villasenor, patient ID verified using two patient identifiers. Advised Mrs. Desouza that it is ok for . Erin Gray to resume his vitamins. She also states patient had leg cramps last night and wanted to know if that could be a side effect of the antibiotics. Advised that leg cramps are not usually a side effect. Suggested that patient stay hydrated and eat bananas and if leg cramps persisted to consult patient's PCP. Reviewed upcoming appointments.      Future Appointments  Date Time Provider Savannah Garnett   9/7/2018 1:00 PM Divine Mott MD [address] [facility]   10/19/2018 2:00 PM PACEMAKER_[facility] SCHED   10/19/2018 2:20 PM Divine Mott MD [address] [facility]

## 2018-09-07 ENCOUNTER — OFFICE VISIT (OUTPATIENT)
Dept: CARDIOLOGY CLINIC | Age: 83
End: 2018-09-07

## 2018-09-07 DIAGNOSIS — Z95.0 PACEMAKER: ICD-10-CM

## 2018-09-07 DIAGNOSIS — R00.1 SYMPTOMATIC BRADYCARDIA: Primary | ICD-10-CM

## 2018-09-07 DIAGNOSIS — Z51.89 VISIT FOR WOUND CHECK: ICD-10-CM

## 2018-09-07 NOTE — PROGRESS NOTES
Patient presents for wound check post-device implantation. The dressing was removed and the site was inspected. The site appeared to be well-healing without ecchymosis/tenderness/erythema. Denies pain, fevers, discharge. Plan:    Continue follow up in device clinic as planned.        Rina Borrero, NP

## 2018-09-07 NOTE — PROGRESS NOTES
Patient states he has completed his antibiotic course. Reports no fevers, chills, or signs of infection.

## 2018-09-11 ENCOUNTER — TELEPHONE (OUTPATIENT)
Dept: CARDIOLOGY CLINIC | Age: 83
End: 2018-09-11

## 2018-09-11 NOTE — TELEPHONE ENCOUNTER
C: Lightheadedness    O: Pacemaker implanted on 8/27/18. Home blood pressure:157/95. Not on any bp lowering medications. Spoke with patient's wife, stated patient had coffee this morning, no other fluid intake. I: Instructed spouse to have patient drink water until his urine is either clear or pale in color. Suspect lightheadedness is d/t dehydration. Will ask pacemaker clinic to check for any alerts. Instructed spouse to call office with any further concerns. Understanding expressed.

## 2018-09-12 NOTE — TELEPHONE ENCOUNTER
Spoke to pts wife regarding his symptoms from yesterday. She stated he hasnt anymore complaints. Asked her to send a manual transmission with his [employer] to verify nothing abnormal.  Went over instructions on sending. She stated she understands.

## 2018-09-12 NOTE — TELEPHONE ENCOUNTER
Spoke with patient. Informed him of no alerts noted on device. Patient states that he has not had any further episodes and has reported increasing his water intake.

## 2018-10-19 ENCOUNTER — OFFICE VISIT (OUTPATIENT)
Dept: CARDIOLOGY CLINIC | Age: 83
End: 2018-10-19

## 2018-10-19 ENCOUNTER — CLINICAL SUPPORT (OUTPATIENT)
Dept: CARDIOLOGY CLINIC | Age: 83
End: 2018-10-19

## 2018-10-19 VITALS
BODY MASS INDEX: 20.65 KG/M2 | DIASTOLIC BLOOD PRESSURE: 74 MMHG | RESPIRATION RATE: 16 BRPM | OXYGEN SATURATION: 98 % | HEART RATE: 84 BPM | HEIGHT: 69 IN | WEIGHT: 139.4 LBS | SYSTOLIC BLOOD PRESSURE: 128 MMHG

## 2018-10-19 DIAGNOSIS — Z95.0 CARDIAC PACEMAKER IN SITU: Primary | ICD-10-CM

## 2018-10-19 DIAGNOSIS — R55 SYNCOPE, UNSPECIFIED SYNCOPE TYPE: ICD-10-CM

## 2018-10-19 DIAGNOSIS — Z95.0 PACEMAKER: Primary | ICD-10-CM

## 2018-10-19 DIAGNOSIS — I44.2 COMPLETE HEART BLOCK (HCC): ICD-10-CM

## 2018-10-19 NOTE — PROGRESS NOTES
HISTORY OF PRESENTING ILLNESS      Haylee Coleman is a 80 y.o. 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 Echocardiogram demonstrated preserved LV function. He underwent implantation of a dual-chamber pacemaker. His device interrogation today demonstrates normal functioning with 17.3% AP and 6.9% RVP with one brief episode of SVT. He denies cardiac complaints today and is doing well. ACTIVE PROBLEM LIST     Patient Active Problem List    Diagnosis Date Noted    Symptomatic bradycardia 08/25/2018    Elevated blood pressure reading without diagnosis of hypertension 08/25/2018    Right bundle branch block 08/25/2018           PAST MEDICAL HISTORY     History reviewed. No pertinent past medical history. PAST SURGICAL HISTORY     History reviewed. No pertinent surgical history. ALLERGIES     No Known Allergies       FAMILY HISTORY     History reviewed. No pertinent family history.  negative for cardiac disease       SOCIAL HISTORY     Social History     Socioeconomic History    Marital status:      Spouse name: Not on file    Number of children: Not on file    Years of education: Not on file    Highest education level: Not on file   Social Needs    Financial resource strain: Not on file    Food insecurity - worry: Not on file    Food insecurity - inability: Not on file    Transportation needs - medical: Not on file   [employer] needs - non-medical: Not on file   Occupational History    Not on file   Tobacco Use    Smoking status: Never Smoker    Smokeless tobacco: Never Used   Substance and Sexual Activity    Alcohol use: No     Frequency: Never    Drug use: Not on file    Sexual activity: Not on file   Other Topics Concern    Not on file   Social History Narrative    Not on file         MEDICATIONS     Current Outpatient Medications   Medication Sig    latanoprost (XALATAN) 0.005 % ophthalmic solution Administer 1 Drop to both eyes nightly. No current facility-administered medications for this visit. I have reviewed the nurses notes, vitals, problem list, allergy list, medical history, family, social history and medications. REVIEW OF SYMPTOMS      General: Pt denies excessive weight gain or loss. Pt is able to conduct ADL's  HEENT: Denies blurred vision, headaches, hearing loss, epistaxis and difficulty swallowing. Respiratory: Denies cough, congestion, shortness of breath, SAM, wheezing or stridor. Cardiovascular: Denies precordial pain, palpitations, edema or PND  Gastrointestinal: Denies poor appetite, indigestion, abdominal pain or blood in stool  Genitourinary: Denies hematuria, dysuria, increased urinary frequency  Musculoskeletal: Denies joint pain or swelling from muscles or joints  Neurologic: Denies tremor, paresthesias, headache, or sensory motor disturbance  Psychiatric: Denies confusion, insomnia, depression  Integumentray: Denies rash, itching or ulcers. Hematologic: Denies easy bruising, bleeding       PHYSICAL EXAMINATION      Vitals:    10/19/18 1407   Weight: 139 lb 6.4 oz (63.2 kg)   Height: 5' 8.5\" (1.74 m)     General: Well developed, in no acute distress. HEENT: No jaundice, oral mucosa moist, no oral ulcers  Neck: Supple, no stiffness, no lymphadenopathy, supple  Heart:  Normal S1/S2 negative S3 or S4. Regular, no murmur, gallop or rub, no jugular venous distention  Respiratory: Clear bilaterally x 4, no wheezing or rales  Abdomen:   Soft, non-tender, bowel sounds are active.   Extremities:  No edema, normal cap refill, no cyanosis. Musculoskeletal: No clubbing, no deformities  Neuro: A&Ox3, speech clear, gait stable, cooperative, no focal neurologic deficits  Skin: Skin color is normal. No rashes or lesions.  Non diaphoretic, moist.  Vascular: 2+ pulses symmetric in all extremities       DIAGNOSTIC DATA      EKG:        LABORATORY DATA      Lab Results   Component Value Date/Time    WBC 8.0 08/27/2018 03:27 AM    HGB 14.2 08/27/2018 03:27 AM    HCT 42.0 08/27/2018 03:27 AM    PLATELET 088 45/00/7598 03:27 AM    MCV 95.0 08/27/2018 03:27 AM      Lab Results   Component Value Date/Time    Sodium 141 08/27/2018 03:27 AM    Potassium 3.7 08/27/2018 03:27 AM    Chloride 106 08/27/2018 03:27 AM    CO2 28 08/27/2018 03:27 AM    Anion gap 7 08/27/2018 03:27 AM    Glucose 94 08/27/2018 03:27 AM    BUN 15 08/27/2018 03:27 AM    Creatinine 0.96 08/27/2018 03:27 AM    BUN/Creatinine ratio 16 08/27/2018 03:27 AM    GFR est AA >60 08/27/2018 03:27 AM    GFR est non-AA >60 08/27/2018 03:27 AM    Calcium 8.4 (L) 08/27/2018 03:27 AM    Bilirubin, total 1.3 (H) 08/25/2018 02:18 PM    AST (SGOT) 22 08/25/2018 02:18 PM    Alk. phosphatase 59 08/25/2018 02:18 PM    Protein, total 7.2 08/25/2018 02:18 PM    Albumin 3.7 08/25/2018 02:18 PM    Globulin 3.5 08/25/2018 02:18 PM    A-G Ratio 1.1 08/25/2018 02:18 PM    ALT (SGPT) 21 08/25/2018 02:18 PM           ASSESSMENT      1. Syncope  2. Complete Heart Block  3. Pacemaker     PLAN     Continue monitoring through device clinic and follow up in one year. FOLLOW-UP   1 year    Thank you, None for allowing me to participate in the care of this extraordinarily pleasant male. Please do not hesitate to contact me for further questions/concerns.      Kanchan Torre NP    [address] [place] [address]  [phone] / [phone] Fax   [phone] / [phone] Fax

## 2018-10-19 NOTE — PROGRESS NOTES
Visit Vitals  /74 (BP 1 Location: Left arm, BP Patient Position: Sitting)   Pulse 84   Resp 16   Ht 5' 8.5\" (1.74 m)   Wt 139 lb 6.4 oz (63.2 kg)   SpO2 98%   BMI 20.89 kg/m²     No caardiac complaints at this time.

## 2018-11-14 ENCOUNTER — TELEPHONE (OUTPATIENT)
Dept: CARDIOLOGY CLINIC | Age: 83
End: 2018-11-14

## 2018-11-14 NOTE — TELEPHONE ENCOUNTER
Patient son Mariola Loredo) called to schedule a follow up with Dr. Rivera Figueroa  Phone# [phone]  Thanks

## 2018-11-15 NOTE — PROGRESS NOTES
LOV:10/19/18  Reason For Visit: Dizziness  EKG:Yes  [facility]  Device:Medtronic Pacemaker  Device Check:10/19/18

## 2018-11-16 ENCOUNTER — OFFICE VISIT (OUTPATIENT)
Dept: CARDIOLOGY CLINIC | Age: 83
End: 2018-11-16

## 2018-11-16 ENCOUNTER — CLINICAL SUPPORT (OUTPATIENT)
Dept: CARDIOLOGY CLINIC | Age: 83
End: 2018-11-16

## 2018-11-16 VITALS
HEART RATE: 97 BPM | SYSTOLIC BLOOD PRESSURE: 160 MMHG | WEIGHT: 139.4 LBS | BODY MASS INDEX: 20.65 KG/M2 | HEIGHT: 69 IN | RESPIRATION RATE: 16 BRPM | DIASTOLIC BLOOD PRESSURE: 80 MMHG | OXYGEN SATURATION: 97 %

## 2018-11-16 DIAGNOSIS — R00.1 SYMPTOMATIC BRADYCARDIA: Primary | ICD-10-CM

## 2018-11-16 DIAGNOSIS — Z95.0 PACEMAKER: Primary | ICD-10-CM

## 2018-11-16 NOTE — PROGRESS NOTES
Visit Vitals  /80   Pulse 97   Resp 16   Ht 5' 8.5\" (1.74 m)   Wt 139 lb 6.4 oz (63.2 kg)   SpO2 97%   BMI 20.89 kg/m²     Room #3 [facility]  Dizziness    States elevated bp began after pacemaker implant

## 2018-11-16 NOTE — PROGRESS NOTES
HISTORY OF PRESENTING ILLNESS      Rodolfo Cardona is a 80 y.o. 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  Echocardiogram demonstrated preserved LV function.  He underwent implantation of a dual-chamber pacemaker. His device interrogation today demonstrates normal functioning with 17.3% AP and 6.9% RVP with one brief episode of SVT. He denies cardiac complaints today and is doing well. ACTIVE PROBLEM LIST     Patient Active Problem List    Diagnosis Date Noted    Symptomatic bradycardia 08/25/2018    Elevated blood pressure reading without diagnosis of hypertension 08/25/2018    Right bundle branch block 08/25/2018           PAST MEDICAL HISTORY     No past medical history on file. PAST SURGICAL HISTORY     No past surgical history on file. ALLERGIES     No Known Allergies       FAMILY HISTORY     No family history on file. negative for cardiac disease       SOCIAL HISTORY     Social History     Socioeconomic History    Marital status:      Spouse name: Not on file    Number of children: Not on file    Years of education: Not on file    Highest education level: Not on file   Social Needs    Financial resource strain: Not on file    Food insecurity - worry: Not on file    Food insecurity - inability: Not on file    Transportation needs - medical: Not on file   [employer] needs - non-medical: Not on file   Occupational History    Not on file   Tobacco Use    Smoking status: Never Smoker    Smokeless tobacco: Never Used   Substance and Sexual Activity    Alcohol use: No     Frequency: Never    Drug use: Not on file    Sexual activity: Not on file   Other Topics Concern    Not on file   Social History Narrative    Not on file         MEDICATIONS     Current Outpatient Medications   Medication Sig    latanoprost (XALATAN) 0.005 % ophthalmic solution Administer 1 Drop to both eyes nightly.      No current facility-administered medications for this visit. I have reviewed the nurses notes, vitals, problem list, allergy list, medical history, family, social history and medications. REVIEW OF SYMPTOMS      General: Pt denies excessive weight gain or loss. Pt is able to conduct ADL's  HEENT: Denies blurred vision, headaches, hearing loss, epistaxis and difficulty swallowing. Respiratory: Denies cough, congestion, shortness of breath, SAM, wheezing or stridor. Cardiovascular: Denies precordial pain, palpitations, edema or PND  Gastrointestinal: Denies poor appetite, indigestion, abdominal pain or blood in stool  Genitourinary: Denies hematuria, dysuria, increased urinary frequency  Musculoskeletal: Denies joint pain or swelling from muscles or joints  Neurologic: Denies tremor, paresthesias, headache, or sensory motor disturbance  Psychiatric: Denies confusion, insomnia, depression  Integumentray: Denies rash, itching or ulcers. Hematologic: Denies easy bruising, bleeding       PHYSICAL EXAMINATION      There were no vitals filed for this visit. General: Well developed, in no acute distress. HEENT: No jaundice, oral mucosa moist, no oral ulcers  Neck: Supple, no stiffness, no lymphadenopathy, supple  Heart:  Normal S1/S2 negative S3 or S4. Regular, no murmur, gallop or rub, no jugular venous distention  Respiratory: Clear bilaterally x 4, no wheezing or rales  Abdomen:   Soft, non-tender, bowel sounds are active.   Extremities:  No edema, normal cap refill, no cyanosis. Musculoskeletal: No clubbing, no deformities  Neuro: A&Ox3, speech clear, gait stable, cooperative, no focal neurologic deficits  Skin: Skin color is normal. No rashes or lesions.  Non diaphoretic, moist.  Vascular: 2+ pulses symmetric in all extremities       DIAGNOSTIC DATA      EKG:        LABORATORY DATA      Lab Results   Component Value Date/Time    WBC 8.0 08/27/2018 03:27 AM    HGB 14.2 08/27/2018 03:27 AM    HCT 42.0 08/27/2018 03:27 AM    PLATELET 166 80/34/1988 03:27 AM    MCV 95.0 08/27/2018 03:27 AM      Lab Results   Component Value Date/Time    Sodium 141 08/27/2018 03:27 AM    Potassium 3.7 08/27/2018 03:27 AM    Chloride 106 08/27/2018 03:27 AM    CO2 28 08/27/2018 03:27 AM    Anion gap 7 08/27/2018 03:27 AM    Glucose 94 08/27/2018 03:27 AM    BUN 15 08/27/2018 03:27 AM    Creatinine 0.96 08/27/2018 03:27 AM    BUN/Creatinine ratio 16 08/27/2018 03:27 AM    GFR est AA >60 08/27/2018 03:27 AM    GFR est non-AA >60 08/27/2018 03:27 AM    Calcium 8.4 (L) 08/27/2018 03:27 AM    Bilirubin, total 1.3 (H) 08/25/2018 02:18 PM    AST (SGOT) 22 08/25/2018 02:18 PM    Alk. phosphatase 59 08/25/2018 02:18 PM    Protein, total 7.2 08/25/2018 02:18 PM    Albumin 3.7 08/25/2018 02:18 PM    Globulin 3.5 08/25/2018 02:18 PM    A-G Ratio 1.1 08/25/2018 02:18 PM    ALT (SGPT) 21 08/25/2018 02:18 PM           ASSESSMENT      1. Syncope  2. Complete Heart Block  3. Pacemaker  4. Lightheadedness       PLAN      Lightheaded episodes began immediately the day after his PPM was checked. Our records do not show that any changes were made to his PPM during his last visit. Native HR histogram appear normal. Will turn off rate response to see if his lightheadedness improves. If not, will have him visit his PCP investigate for further evaluations.         FOLLOW-UP     1 month        Alexsandra Barroso MD  Cardiac Electrophysiology / Cardiology    [address]  [address] [place] [address], [address]  [phone] / [phone] Fax   [phone] / [phone] Fax

## 2019-04-04 ENCOUNTER — OFFICE VISIT (OUTPATIENT)
Dept: CARDIOLOGY CLINIC | Age: 84
End: 2019-04-04

## 2019-04-04 DIAGNOSIS — Z95.0 PACEMAKER: Primary | ICD-10-CM

## 2020-01-07 ENCOUNTER — OFFICE VISIT (OUTPATIENT)
Dept: CARDIOLOGY CLINIC | Age: 85
End: 2020-01-07

## 2020-01-07 DIAGNOSIS — Z95.0 PACEMAKER: Primary | ICD-10-CM

## 2020-03-16 ENCOUNTER — TELEPHONE (OUTPATIENT)
Dept: CARDIOLOGY CLINIC | Age: 85
End: 2020-03-16

## 2020-03-16 NOTE — TELEPHONE ENCOUNTER
Patient called to let the office know that he did receive Dr. Amanda Barrios letter in the mail and will call to schedule an appointment when this Corona virus dies down.      Phone:  [phone]

## 2020-07-09 ENCOUNTER — OFFICE VISIT (OUTPATIENT)
Dept: CARDIOLOGY CLINIC | Age: 85
End: 2020-07-09

## 2020-07-09 DIAGNOSIS — Z95.0 PACEMAKER: Primary | ICD-10-CM

## 2021-08-27 ENCOUNTER — OFFICE VISIT (OUTPATIENT)
Dept: CARDIOLOGY CLINIC | Age: 86
End: 2021-08-27
Payer: MEDICARE

## 2021-08-27 VITALS
BODY MASS INDEX: 17.74 KG/M2 | OXYGEN SATURATION: 98 % | HEART RATE: 97 BPM | RESPIRATION RATE: 18 BRPM | DIASTOLIC BLOOD PRESSURE: 80 MMHG | SYSTOLIC BLOOD PRESSURE: 160 MMHG | HEIGHT: 69 IN | WEIGHT: 119.8 LBS

## 2021-08-27 DIAGNOSIS — Z95.0 PACEMAKER: Primary | ICD-10-CM

## 2021-08-27 DIAGNOSIS — Z95.0 CARDIAC PACEMAKER IN SITU: Primary | ICD-10-CM

## 2021-08-27 PROCEDURE — G0463 HOSPITAL OUTPT CLINIC VISIT: HCPCS | Performed by: INTERNAL MEDICINE

## 2021-08-27 PROCEDURE — 93280 PM DEVICE PROGR EVAL DUAL: CPT | Performed by: INTERNAL MEDICINE

## 2021-08-27 PROCEDURE — 93288 INTERROG EVL PM/LDLS PM IP: CPT | Performed by: INTERNAL MEDICINE

## 2021-08-27 PROCEDURE — 99215 OFFICE O/P EST HI 40 MIN: CPT | Performed by: INTERNAL MEDICINE

## 2021-08-27 NOTE — PROGRESS NOTES
Room #: 4    C/o intermittent dizziness. Numbness in feet that started in April that travels up to his hands. Visit Vitals  BP (!) 160/80 (BP 1 Location: Right upper arm, BP Patient Position: Sitting, BP Cuff Size: Adult)   Pulse 97   Resp 18   Ht 5' 8.5\" (1.74 m)   Wt 119 lb 12.8 oz (54.3 kg)   SpO2 98%   BMI 17.95 kg/m²         Chest pain:  NO  Shortness of breath:  NO  Edema: NO  Palpitations, skipped beats, rapid heartbeat:  NO  Dizziness:  YES    1. Have you been to the ER, urgent care clinic since your last visit? Hospitalized since your last visit? No    2. Have you seen or consulted any other health care providers outside of the [address] [place] [address] since your last visit? Include any pap smears or colon screening.  No      Refills:  NO

## 2021-08-27 NOTE — PROGRESS NOTES
HISTORY OF PRESENTING ILLNESS      Emerson Oquendo is a 80 y.o. 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  Echocardiogram demonstrated preserved LV function.  He underwent implantation of a dual-chamber pacemaker. His device interrogation today demonstrates normal functioning. ACTIVE PROBLEM LIST     Patient Active Problem List    Diagnosis Date Noted    Symptomatic bradycardia 08/25/2018    Elevated blood pressure reading without diagnosis of hypertension 08/25/2018    Right bundle branch block 08/25/2018           PAST MEDICAL HISTORY     Past Medical History:   Diagnosis Date    Pacemaker     Medtronic Pacemaker           PAST SURGICAL HISTORY     Past Surgical History:   Procedure Laterality Date    HX PACEMAKER            ALLERGIES     No Known Allergies       FAMILY HISTORY     No family history on file. negative for cardiac disease       SOCIAL HISTORY     Social History     Socioeconomic History    Marital status:      Spouse name: Not on file    Number of children: Not on file    Years of education: Not on file    Highest education level: Not on file   Tobacco Use    Smoking status: Never Smoker    Smokeless tobacco: Never Used   Substance and Sexual Activity    Alcohol use: No     Social Determinants of Health     Financial Resource Strain:     Difficulty of Paying Living Expenses:    Food Insecurity:     Worried About Running Out of Food in the Last Year:     [address] in the Last Year:    Transportation Needs:     Lack of Transportation (Medical):      Lack of Transportation (Non-Medical):    Physical Activity:     Days of Exercise per Week:     Minutes of Exercise per Session:    Stress:     Feeling of Stress :    Social Connections:     Frequency of Communication with Friends and Family:     Frequency of Social Gatherings with Friends and Family:     Attends [employer] Services:     Active Member of Clubs or Organizations:     Attends Club or Organization Meetings:     Marital Status:          MEDICATIONS     Current Outpatient Medications   Medication Sig    latanoprost (XALATAN) 0.005 % ophthalmic solution Administer 1 Drop to both eyes nightly. No current facility-administered medications for this visit. I have reviewed the nurses notes, vitals, problem list, allergy list, medical history, family, social history and medications. REVIEW OF SYMPTOMS      General: Pt denies excessive weight gain or loss. Pt is able to conduct ADL's  HEENT: Denies blurred vision, headaches, hearing loss, epistaxis and difficulty swallowing. Respiratory: Denies cough, congestion, shortness of breath, SAM, wheezing or stridor. Cardiovascular: Denies precordial pain, palpitations, edema or PND  Gastrointestinal: Denies poor appetite, indigestion, abdominal pain or blood in stool  Genitourinary: Denies hematuria, dysuria, increased urinary frequency  Musculoskeletal: Denies joint pain or swelling from muscles or joints  Neurologic: Denies tremor, paresthesias, headache, or sensory motor disturbance  Psychiatric: Denies confusion, insomnia, depression  Integumentray: Denies rash, itching or ulcers. Hematologic: Denies easy bruising, bleeding       PHYSICAL EXAMINATION      There were no vitals filed for this visit. General: Well developed, in no acute distress. HEENT: No jaundice, oral mucosa moist, no oral ulcers  Neck: Supple, no stiffness, no lymphadenopathy, supple  Heart:  Normal S1/S2 negative S3 or S4. Regular, no murmur, gallop or rub, no jugular venous distention  Respiratory: Clear bilaterally x 4, no wheezing or rales  Abdomen:   Soft, non-tender, bowel sounds are active.   Extremities:  No edema, normal cap refill, no cyanosis.   Musculoskeletal: No clubbing, no deformities  Neuro: A&Ox3, speech clear, gait stable, cooperative, no focal neurologic deficits  Skin: Skin color is normal. No rashes or lesions. Non diaphoretic, moist.  Vascular: 2+ pulses symmetric in all extremities       DIAGNOSTIC DATA      EKG:        LABORATORY DATA      Lab Results   Component Value Date/Time    WBC 8.0 08/27/2018 03:27 AM    HGB 14.2 08/27/2018 03:27 AM    HCT 42.0 08/27/2018 03:27 AM    PLATELET 677 87/87/8241 03:27 AM    MCV 95.0 08/27/2018 03:27 AM      Lab Results   Component Value Date/Time    Sodium 141 08/27/2018 03:27 AM    Potassium 3.7 08/27/2018 03:27 AM    Chloride 106 08/27/2018 03:27 AM    CO2 28 08/27/2018 03:27 AM    Anion gap 7 08/27/2018 03:27 AM    Glucose 94 08/27/2018 03:27 AM    BUN 15 08/27/2018 03:27 AM    Creatinine 0.96 08/27/2018 03:27 AM    BUN/Creatinine ratio 16 08/27/2018 03:27 AM    GFR est AA >60 08/27/2018 03:27 AM    GFR est non-AA >60 08/27/2018 03:27 AM    Calcium 8.4 (L) 08/27/2018 03:27 AM    Bilirubin, total 1.3 (H) 08/25/2018 02:18 PM    Alk. phosphatase 59 08/25/2018 02:18 PM    Protein, total 7.2 08/25/2018 02:18 PM    Albumin 3.7 08/25/2018 02:18 PM    Globulin 3.5 08/25/2018 02:18 PM    A-G Ratio 1.1 08/25/2018 02:18 PM    ALT (SGPT) 21 08/25/2018 02:18 PM           ASSESSMENT      1. Syncope  2. Complete Heart Block  3. Pacemaker  4.  Lightheadedness       PLAN     Continue monitoring in device clinic       FOLLOW-UP     1 year        Adina Jason MD  Cardiac Electrophysiology / Cardiology    [address]  [address] [facility], [place] [address]  [place]  [phone] / [phone] Fax   [phone] / [phone] Fax

## 2021-10-26 ENCOUNTER — OFFICE VISIT (OUTPATIENT)
Dept: CARDIOLOGY CLINIC | Age: 86
End: 2021-10-26
Payer: MEDICARE

## 2021-10-26 DIAGNOSIS — Z95.0 CARDIAC PACEMAKER IN SITU: Primary | ICD-10-CM

## 2021-10-26 PROCEDURE — 93296 REM INTERROG EVL PM/IDS: CPT | Performed by: NURSE PRACTITIONER

## 2021-10-26 PROCEDURE — 93294 REM INTERROG EVL PM/LDLS PM: CPT | Performed by: NURSE PRACTITIONER

## 2021-10-26 NOTE — LETTER
10/27/2021 10:17 AM    Mr. Radha Asif  [address] [address]        Dear Mr. Radha Asif,    We have received your recent remote monitor check of your implanted device on 10/26/2021. Your remaining estimated battery life is 6 years and your device is working normally & appropriately. Your next remote monitor check is scheduled for 2/1/2022. Please make a note on your calender to send on time! Your next clinic/office check is scheduled for Monday, 8/29/2022 at 3:00 pm.  You will have your device checked then see the provider. Please bring a complete list of your medications with strengths and dosages to this appointment. Also, be prepared to discuss any symptoms you may be having since your last visit. Please plan to arrive 10 minutes early to allow time for check-in. [employer] parking is CLOSED once again, due to [address]. The parking lot entrance that is next to [address] is also CLOSED. If you have difficulty walking from the parking lot, please arrange to have someone drop you off to allow time to check into the office. You are allowed to have one visitor accompany you to your appointment and no children under the age of 13 are allowed. Masks are mandatory while in the building. If you have any questions, please call the Pacemaker/ICD clinic at the [facility] location at [phone]. We appreciate you staying remotely connected! Sincerely,    Denver Betters BSN, RN  Cardiac Device Clinic Coordinator  [facility]  [address] [place] [address], [id number] [place] [address]  [phone]

## 2022-02-03 ENCOUNTER — OFFICE VISIT (OUTPATIENT)
Dept: CARDIOLOGY CLINIC | Age: 87
End: 2022-02-03
Payer: MEDICARE

## 2022-02-03 DIAGNOSIS — Z95.0 CARDIAC PACEMAKER IN SITU: Primary | ICD-10-CM

## 2022-02-03 PROCEDURE — 93296 REM INTERROG EVL PM/IDS: CPT | Performed by: INTERNAL MEDICINE

## 2022-02-03 PROCEDURE — 93294 REM INTERROG EVL PM/LDLS PM: CPT | Performed by: INTERNAL MEDICINE

## 2022-03-19 PROBLEM — R00.1 SYMPTOMATIC BRADYCARDIA: Status: ACTIVE | Noted: 2018-08-25

## 2022-03-19 PROBLEM — R03.0 ELEVATED BLOOD PRESSURE READING WITHOUT DIAGNOSIS OF HYPERTENSION: Status: ACTIVE | Noted: 2018-08-25

## 2022-03-20 PROBLEM — I45.10 RIGHT BUNDLE BRANCH BLOCK: Status: ACTIVE | Noted: 2018-08-25

## 2022-05-03 ENCOUNTER — OFFICE VISIT (OUTPATIENT)
Dept: CARDIOLOGY CLINIC | Age: 87
End: 2022-05-03
Payer: MEDICARE

## 2022-05-03 DIAGNOSIS — Z95.0 CARDIAC PACEMAKER IN SITU: Primary | ICD-10-CM

## 2022-05-03 PROCEDURE — 93296 REM INTERROG EVL PM/IDS: CPT | Performed by: INTERNAL MEDICINE

## 2022-08-05 ENCOUNTER — OFFICE VISIT (OUTPATIENT)
Dept: CARDIOLOGY CLINIC | Age: 87
End: 2022-08-05

## 2022-08-05 DIAGNOSIS — Z95.0 CARDIAC PACEMAKER IN SITU: Primary | ICD-10-CM

## 2022-08-26 ENCOUNTER — TELEPHONE (OUTPATIENT)
Dept: CARDIOLOGY CLINIC | Age: 87
End: 2022-08-26

## 2022-08-26 NOTE — PROGRESS NOTES
Scheduled remote transmission. Device functioning appropriately as programmed. See scanned docments.

## 2022-08-26 NOTE — TELEPHONE ENCOUNTER
Spoke to patient and spouse, ID verified using two patient identifiers   Since remote came through on 8/5/22, will process and move out annual in-clinic device check to 11/9 and add provider visit.   Verbalized underestanding

## 2022-10-27 PROBLEM — Z95.0 PACEMAKER: Status: ACTIVE | Noted: 2022-10-27

## 2022-10-27 NOTE — PROGRESS NOTES
Cardiac Electrophysiology Office Follow-up    NAME:  Tim Peraza   :  1929  MRM:  [id number]    Date:  2022         Assessment and Plan:     1. Pacemaker  -     AMB POC EKG ROUTINE W/ 12 LEADS, INTER & REP  2. Symptomatic bradycardia  -     AMB POC EKG ROUTINE W/ 12 LEADS, INTER & REP       Device Interrogation    Device: Medtronic dual chamber pacemaker   Battery/Longevity: 5 years  Programmed Mode: DDD     RA lead    Intrinsic amplitude: 4.8 mV    Pacing impedance: 475 ohms    Pacing threshold: 1.0V @ 0.4ms    % Pacin.4  RV lead    Intrinsic amplitude:  16.0 mV    Pacing impedance:  969 ohms    Pacing threshold: 0.75V @ 0.4ms    %pacin.4      No sustained VT/VF or therapies  No AF/AT  Device shows 49 minutes of AF. EGMs reviewed and show PACs. Programming changes:  Iterative programming was performed to test the leads sensing and threshold parameters without any permanent changes. EF 55-60% by echo in 2018. Continue Q3 month remote checks. Follow-up in the EP clinic in one year, or sooner for any concerns. Subjective:     Tim Peraza, a 80y.o. year-old who presents for followup of pacemaker. He is doing well and denies chest pain, dyspnea, palpitations, dizziness or syncope. He has had bilateral feet numbness for over a year. His feet were warm with good pulses. He states that it doesn't effect his walking. He plans to follow-up with his PCP. Review of Systems:   12 point review of systems was performed.  All negative except for HPI    Exam:     Physical Exam:  /70 (BP 1 Location: Left upper arm, BP Patient Position: Sitting, BP Cuff Size: Adult)   Pulse 90   Resp 16   Ht 5' 8.5\" (1.74 m)   Wt 115 lb 9.6 oz (52.4 kg)   SpO2 99%   BMI 17.32 kg/m²     PHYSICAL EXAM  General:  Alert and oriented, in no acute distress  Head:  Atraumatic, normocephalic  Eyes:  extraocular muscles intact  Neck:  Supple, normal range of motion  Lungs: Clear to auscultation bilaterally, no wheezes/rales/rhonchi   Cardiovascular:  Regular rate and rhythm, normal S1-S2, no murmurs/rubs/gallops  Abdomen:  Soft, nontender, nondistended, normoactive bowel sounds  Skin:  Intact, no rash  Extremities:, no clubbing, cyanosis, or edema  Musculoskeletal: normal range of motion  Neurological:  Alert and oriented, no focal neurologic deficits  Psychiatric:  Normal mood and affect      Medications:     Current Outpatient Medications   Medication Sig    cholecalciferol, vitamin D3, (VITAMIN D3 PO) Take 1 Tablet by mouth daily. latanoprost (XALATAN) 0.005 % ophthalmic solution Administer 1 Drop to both eyes nightly. No current facility-administered medications for this visit. Diagnostic Data Review:        ECHO 8/26/18 LVEF 55-60% no WMA  8/27/18 Medtronic dual chamber PPM             ___________________________________________________    Link Luna NP    Cardiac Electrophysiology  [facility]  [facility], [place] [address] [facility].  [address], [address] [place] [address] [phone]

## 2022-11-21 ENCOUNTER — OFFICE VISIT (OUTPATIENT)
Dept: CARDIOLOGY CLINIC | Age: 87
End: 2022-11-21
Payer: MEDICARE

## 2022-11-21 VITALS
HEART RATE: 90 BPM | RESPIRATION RATE: 16 BRPM | OXYGEN SATURATION: 99 % | HEIGHT: 69 IN | BODY MASS INDEX: 17.12 KG/M2 | SYSTOLIC BLOOD PRESSURE: 132 MMHG | DIASTOLIC BLOOD PRESSURE: 70 MMHG | WEIGHT: 115.6 LBS

## 2022-11-21 DIAGNOSIS — Z95.0 CARDIAC PACEMAKER IN SITU: Primary | ICD-10-CM

## 2022-11-21 DIAGNOSIS — R00.1 SYMPTOMATIC BRADYCARDIA: ICD-10-CM

## 2022-11-21 DIAGNOSIS — Z95.0 PACEMAKER: Primary | ICD-10-CM

## 2022-11-21 PROCEDURE — G0463 HOSPITAL OUTPT CLINIC VISIT: HCPCS | Performed by: NURSE PRACTITIONER

## 2022-11-21 PROCEDURE — 93010 ELECTROCARDIOGRAM REPORT: CPT | Performed by: NURSE PRACTITIONER

## 2022-11-21 PROCEDURE — 93280 PM DEVICE PROGR EVAL DUAL: CPT | Performed by: INTERNAL MEDICINE

## 2022-11-21 PROCEDURE — 1123F ACP DISCUSS/DSCN MKR DOCD: CPT | Performed by: NURSE PRACTITIONER

## 2022-11-21 PROCEDURE — 99213 OFFICE O/P EST LOW 20 MIN: CPT | Performed by: NURSE PRACTITIONER

## 2022-11-21 PROCEDURE — 93005 ELECTROCARDIOGRAM TRACING: CPT | Performed by: NURSE PRACTITIONER

## 2022-11-21 NOTE — PROGRESS NOTES
Room EP 3    Chief Complaint   Patient presents with    Syncope    Pacemaker Check     MDT     Visit Vitals  /70 (BP 1 Location: Left upper arm, BP Patient Position: Sitting, BP Cuff Size: Adult)   Pulse 90   Resp 16   Ht 5' 8.5\" (1.74 m)   Wt 115 lb 9.6 oz (52.4 kg)   SpO2 99%   BMI 17.32 kg/m²         Chest pain: no    Shortness of breath: no    Edema: no    Palpitations, Skipped beats, Rapid heartbeat: no    Dizziness: no    Fatigue:no    New diagnosis/Surgeries: no    1. Have you been to the ER, urgent care clinic since your last visit? Hospitalized since your last visit? no     2. Have you seen or consulted any other health care providers outside of the [address] [place] [address] since your last visit? Include any pap smears or colon screening.  no    Refills:no

## 2023-02-21 ENCOUNTER — OFFICE VISIT (OUTPATIENT)
Dept: CARDIOLOGY CLINIC | Age: 88
End: 2023-02-21

## 2023-02-21 DIAGNOSIS — Z95.0 CARDIAC PACEMAKER IN SITU: Primary | ICD-10-CM

## 2023-05-23 ENCOUNTER — NURSE ONLY (OUTPATIENT)
Age: 88
End: 2023-05-23

## 2023-05-23 DIAGNOSIS — Z95.0 PRESENCE OF CARDIAC PACEMAKER: Primary | ICD-10-CM

## 2023-12-01 ENCOUNTER — TELEPHONE (OUTPATIENT)
Age: 88
End: 2023-12-01

## 2024-02-01 ENCOUNTER — PROCEDURE VISIT (OUTPATIENT)
Age: 89
End: 2024-02-01
Payer: MEDICARE

## 2024-02-01 ENCOUNTER — OFFICE VISIT (OUTPATIENT)
Age: 89
End: 2024-02-01
Payer: MEDICARE

## 2024-02-01 VITALS
DIASTOLIC BLOOD PRESSURE: 60 MMHG | HEART RATE: 68 BPM | RESPIRATION RATE: 16 BRPM | OXYGEN SATURATION: 99 % | HEIGHT: 69 IN | BODY MASS INDEX: 16.41 KG/M2 | WEIGHT: 110.8 LBS | SYSTOLIC BLOOD PRESSURE: 128 MMHG

## 2024-02-01 DIAGNOSIS — I36.1 NONRHEUMATIC TRICUSPID VALVE REGURGITATION: ICD-10-CM

## 2024-02-01 DIAGNOSIS — Z95.0 CARDIAC PACEMAKER IN SITU: Primary | ICD-10-CM

## 2024-02-01 DIAGNOSIS — Z95.0 PACEMAKER: Primary | ICD-10-CM

## 2024-02-01 DIAGNOSIS — I44.2 ATRIOVENTRICULAR BLOCK, THIRD DEGREE (HCC): ICD-10-CM

## 2024-02-01 DIAGNOSIS — I47.29 NSVT (NONSUSTAINED VENTRICULAR TACHYCARDIA) (HCC): ICD-10-CM

## 2024-02-01 PROCEDURE — 93280 PM DEVICE PROGR EVAL DUAL: CPT | Performed by: INTERNAL MEDICINE

## 2024-02-01 PROCEDURE — 99214 OFFICE O/P EST MOD 30 MIN: CPT | Performed by: NURSE PRACTITIONER

## 2024-02-01 NOTE — PROGRESS NOTES
Room #:  3    Chief Complaint   Patient presents with    Device Check     MDT ppm     /60 (Site: Left Upper Arm, Position: Sitting, Cuff Size: Medium Adult)   Pulse 68   Resp 16   Ht 1.74 m (5' 8.5\")   Wt 50.3 kg (110 lb 12.8 oz)   SpO2 99%   BMI 16.60 kg/m²         Chest pain: NO       1. Have you been to the ER, urgent care clinic outside of [facility] since your last visit?  Hospitalized since your last visit?  NO        Refills:  NO  
condition              Patient Active Problem List   Diagnosis    Elevated blood pressure reading without diagnosis of hypertension    Symptomatic bradycardia    Right bundle branch block    Pacemaker       Past Medical History:   Diagnosis Date    Pacemaker     Medtronic Pacemaker       Past Surgical History:   Procedure Laterality Date    PACEMAKER         No Known Allergies    Social History     Tobacco Use    Smoking status: Never    Smokeless tobacco: Never   Substance Use Topics    Alcohol use: No       No family history on file.        OBJECTIVE:    Physical Exam    Vitals:   Vitals:    02/01/24 1305   BP: 128/60   Site: Left Upper Arm   Position: Sitting   Cuff Size: Medium Adult   Pulse: 68   Resp: 16   SpO2: 99%   Weight: 50.3 kg (110 lb 12.8 oz)   Height: 1.74 m (5' 8.5\")       General:    Alert, cooperative, no distress, appears stated age.   Neck:   Supple, no JVD.   Back:     Symmetric.   Lungs:     Clear to auscultation bilaterally.   Heart::    Regular rate and rhythm.  No murmur, click, rub or gallop.   Abdomen:     Soft, non-tender. Bowel sounds normal.    MSK:   Extremities normal, atraumatic.  Moves extremities independently.   Vasc/lymph:   No lower extremity edema.   Skin:   Skin color normal. No rashes or lesions on visible areas.  Left chest pacemaker site well healed, prominent along all borders.   Neurologic:   Alert, moves all extremities.        Data Review:     Radiology:   XR Results (most recent):    CT Result (most recent):  No results found for this or any previous visit from the past 3650 days.    MRI Result (most recent):  No results found for this or any previous visit from the past 3650 days.          Current meds:  Current Outpatient Medications   Medication Sig Dispense Refill    Multiple Vitamin (ONE-A-DAY 55 PLUS PO) Take 1 tablet by mouth daily      Multiple Vitamins-Minerals (PRESERVISION AREDS 2 PO) Take 1 tablet by mouth 2 times daily      latanoprost (XALATAN) 0.005 %

## 2024-02-22 ENCOUNTER — ANCILLARY PROCEDURE (OUTPATIENT)
Age: 89
End: 2024-02-22
Payer: MEDICARE

## 2024-02-22 VITALS
HEIGHT: 68 IN | SYSTOLIC BLOOD PRESSURE: 122 MMHG | BODY MASS INDEX: 16.67 KG/M2 | DIASTOLIC BLOOD PRESSURE: 70 MMHG | HEART RATE: 84 BPM | WEIGHT: 110 LBS

## 2024-02-22 DIAGNOSIS — I36.1 NONRHEUMATIC TRICUSPID VALVE REGURGITATION: ICD-10-CM

## 2024-02-22 DIAGNOSIS — Z95.0 PACEMAKER: ICD-10-CM

## 2024-02-22 LAB
ECHO AO ROOT DIAM: 3.6 CM
ECHO AO ROOT INDEX: 2.26 CM/M2
ECHO AV MEAN GRADIENT: 3 MMHG
ECHO AV MEAN VELOCITY: 0.8 M/S
ECHO AV PEAK GRADIENT: 6 MMHG
ECHO AV PEAK VELOCITY: 1.3 M/S
ECHO AV VELOCITY RATIO: 0.77
ECHO AV VTI: 23.8 CM
ECHO BSA: 1.55 M2
ECHO EST RA PRESSURE: 3 MMHG
ECHO LA DIAMETER INDEX: 2.01 CM/M2
ECHO LA DIAMETER: 3.2 CM
ECHO LA TO AORTIC ROOT RATIO: 0.89
ECHO LA VOL A-L A4C: 60 ML (ref 18–58)
ECHO LA VOL MOD A4C: 52 ML (ref 18–58)
ECHO LA VOLUME INDEX A-L A4C: 38 ML/M2 (ref 16–34)
ECHO LA VOLUME INDEX MOD A4C: 33 ML/M2 (ref 16–34)
ECHO LV E' LATERAL VELOCITY: 5 CM/S
ECHO LV E' SEPTAL VELOCITY: 5 CM/S
ECHO LV EDV A2C: 67 ML
ECHO LV EDV A4C: 103 ML
ECHO LV EDV BP: 90 ML (ref 67–155)
ECHO LV EDV INDEX A4C: 65 ML/M2
ECHO LV EDV INDEX BP: 57 ML/M2
ECHO LV EDV NDEX A2C: 42 ML/M2
ECHO LV EJECTION FRACTION A2C: 32 %
ECHO LV EJECTION FRACTION A4C: 28 %
ECHO LV EJECTION FRACTION BIPLANE: 31 % (ref 55–100)
ECHO LV ESV A2C: 45 ML
ECHO LV ESV A4C: 74 ML
ECHO LV ESV BP: 62 ML (ref 22–58)
ECHO LV ESV INDEX A2C: 28 ML/M2
ECHO LV ESV INDEX A4C: 47 ML/M2
ECHO LV ESV INDEX BP: 39 ML/M2
ECHO LV FRACTIONAL SHORTENING: 12 % (ref 28–44)
ECHO LV INTERNAL DIMENSION DIASTOLE INDEX: 3.08 CM/M2
ECHO LV INTERNAL DIMENSION DIASTOLIC: 4.9 CM (ref 4.2–5.9)
ECHO LV INTERNAL DIMENSION SYSTOLIC INDEX: 2.7 CM/M2
ECHO LV INTERNAL DIMENSION SYSTOLIC: 4.3 CM
ECHO LV IVSD: 0.9 CM (ref 0.6–1)
ECHO LV MASS 2D: 141.9 G (ref 88–224)
ECHO LV MASS INDEX 2D: 89.3 G/M2 (ref 49–115)
ECHO LV POSTERIOR WALL DIASTOLIC: 0.8 CM (ref 0.6–1)
ECHO LV RELATIVE WALL THICKNESS RATIO: 0.33
ECHO LVOT AV VTI INDEX: 0.79
ECHO LVOT MEAN GRADIENT: 2 MMHG
ECHO LVOT PEAK GRADIENT: 4 MMHG
ECHO LVOT PEAK VELOCITY: 1 M/S
ECHO LVOT VTI: 18.8 CM
ECHO MV A VELOCITY: 1.18 M/S
ECHO MV AREA PHT: 3.2 CM2
ECHO MV E DECELERATION TIME (DT): 234.4 MS
ECHO MV E VELOCITY: 0.67 M/S
ECHO MV E/A RATIO: 0.57
ECHO MV E/E' LATERAL: 13.4
ECHO MV E/E' RATIO (AVERAGED): 13.4
ECHO MV PRESSURE HALF TIME (PHT): 68 MS
ECHO RIGHT VENTRICULAR SYSTOLIC PRESSURE (RVSP): 39 MMHG
ECHO RV FREE WALL PEAK S': 16 CM/S
ECHO RV INTERNAL DIMENSION: 3.2 CM
ECHO RV TAPSE: 2.5 CM (ref 1.7–?)
ECHO TV REGURGITANT MAX VELOCITY: 3.01 M/S
ECHO TV REGURGITANT PEAK GRADIENT: 36 MMHG

## 2024-02-22 PROCEDURE — 93306 TTE W/DOPPLER COMPLETE: CPT | Performed by: SPECIALIST

## 2024-02-27 ENCOUNTER — TELEPHONE (OUTPATIENT)
Age: 89
End: 2024-02-27

## 2024-02-27 DIAGNOSIS — I47.29 NSVT (NONSUSTAINED VENTRICULAR TACHYCARDIA) (HCC): ICD-10-CM

## 2024-02-27 DIAGNOSIS — Z95.0 PACEMAKER: Primary | ICD-10-CM

## 2024-02-27 DIAGNOSIS — Z95.0 PRESENCE OF CARDIAC PACEMAKER: ICD-10-CM

## 2024-02-27 DIAGNOSIS — Z95.0 CARDIAC PACEMAKER IN SITU: ICD-10-CM

## 2024-02-27 DIAGNOSIS — I36.1 NONRHEUMATIC TRICUSPID VALVE REGURGITATION: ICD-10-CM

## 2024-02-27 DIAGNOSIS — R00.1 BRADYCARDIA, UNSPECIFIED: ICD-10-CM

## 2024-02-27 DIAGNOSIS — I44.2 ATRIOVENTRICULAR BLOCK, THIRD DEGREE (HCC): ICD-10-CM

## 2024-02-27 NOTE — TELEPHONE ENCOUNTER
Recent echo showed LVEF declined to 25-30% compared to 2018, when it was noted to be normal.    RV pacing 56%.  He's technically a candidate for CRT upgrade, but with his advanced age, low body weight, & minimal associated symptoms, we don't currently recommend CRT upgrade.  However, if he's willing to try starting GDMT to improve LVEF, that's what we would recommend.  Based on Bps in Epic, it looks like he should be able to tolerate Toprol XL 25 mg po daily with lisinopril 2.5 mg po daily.  We don't have any recent labs, so please also check a BMP to make sure renal function is ok to tolerate the lisinopril.

## 2024-02-29 NOTE — TELEPHONE ENCOUNTER
Verified patient with two types of identifiers. Spoke with patient's son, Sushant verified on PHI. Notified of results and NP recommendations.     Sushant verified that patient continues to deny any symptoms. He will be with patient later today and will discuss possibility starting Toprol XL and Lisinopril. Patient has not had any recent blood work. Notified that we will need updated labs if he decides to start Lisinopril. Sushant will call back after the speaks with patient to let us know if he would like to start medication. Patient's son verbalized understanding and will call with any other questions.

## 2024-03-01 RX ORDER — METOPROLOL SUCCINATE 25 MG/1
25 TABLET, EXTENDED RELEASE ORAL DAILY
Qty: 30 TABLET | Refills: 5 | Status: SHIPPED | OUTPATIENT
Start: 2024-03-01

## 2024-03-01 RX ORDER — LISINOPRIL 2.5 MG/1
2.5 TABLET ORAL DAILY
Qty: 30 TABLET | Refills: 5 | Status: SHIPPED | OUTPATIENT
Start: 2024-03-01

## 2024-03-01 NOTE — TELEPHONE ENCOUNTER
Cristina Meyers, APRN - NP Nurse Practitioner Signed 2/27/2024     Copy     Recent echo showed LVEF declined to 25-30% compared to 2018, when it was noted to be normal.     RV pacing 56%.  He's technically a candidate for CRT upgrade, but with his advanced age, low body weight, & minimal associated symptoms, we don't currently recommend CRT upgrade.  However, if he's willing to try starting GDMT to improve LVEF, that's what we would recommend.  Based on Bps in Epic, it looks like he should be able to tolerate Toprol XL 25 mg po daily with lisinopril 2.5 mg po daily.  We don't have any recent labs, so please also check a BMP to make sure renal function is ok to tolerate the lisinopril.        Requested Prescriptions     Signed Prescriptions Disp Refills    metoprolol succinate (TOPROL XL) 25 MG extended release tablet 30 tablet 5     Sig: Take 1 tablet by mouth daily     Authorizing Provider: CRISTINA MEYERS     Ordering User: MANUEL OBRIEN    lisinopril (PRINIVIL;ZESTRIL) 2.5 MG tablet 30 tablet 5     Sig: Take 1 tablet by mouth daily     Authorizing Provider: CRISTINA MEYERS     Ordering User: MANUEL OBRIEN     Orders Placed This Encounter   Procedures    Basic Metabolic Panel     Standing Status:   Future     Standing Expiration Date:   3/1/2025       Called patient's son Sushant, ID verified using two patient identifiers.  Advised him that the prescriptions have been sent to his pharmacy.  Lab orders placed and patient requested they be sent to Dr. Siegrist's office.  Discussed that labs can also be done at the [facility] behind [facility].  Patient verbalized understanding and will call back with any other questions or concerns.

## 2024-03-01 NOTE — TELEPHONE ENCOUNTER
Pt's son stated pt would like to start the two medications recommended and would like them called into the pharmacy.       Massiel [phone]

## 2024-03-06 ENCOUNTER — TELEPHONE (OUTPATIENT)
Age: 89
End: 2024-03-06

## 2024-03-06 NOTE — TELEPHONE ENCOUNTER
Patient son Sushant called to ask what is the low blood pressure for the patient? What should he look for?     Sushant # [phone]

## 2024-03-07 NOTE — TELEPHONE ENCOUNTER
Returned patient call, ID verified using two patient identifiers.  Sushant is calling to discuss what would be considered a low blood pressure since his father has started on these new medications.    Advised Sushant that we would recommend holding the meds if his father's systolic (top number) is 100 or less.  If he notices that he is having low pressures and holding them more than he is taking them he should call and notify us.    Patient's son appreciative of the return call and will call back with any other questions or concerns.    Future Appointments   Date Time Provider Department Center   2/19/2025  1:00 PM PACEMAKER, [facility]   2/19/2025  1:20 PM Rose Mary Sharma MD [facility] AMB

## 2024-03-25 DIAGNOSIS — Z95.0 PRESENCE OF CARDIAC PACEMAKER: ICD-10-CM

## 2024-03-25 DIAGNOSIS — I47.29 NSVT (NONSUSTAINED VENTRICULAR TACHYCARDIA) (HCC): ICD-10-CM

## 2024-03-25 DIAGNOSIS — R00.1 BRADYCARDIA, UNSPECIFIED: ICD-10-CM

## 2024-03-25 DIAGNOSIS — Z95.0 CARDIAC PACEMAKER IN SITU: ICD-10-CM

## 2024-03-25 DIAGNOSIS — I36.1 NONRHEUMATIC TRICUSPID VALVE REGURGITATION: ICD-10-CM

## 2024-03-25 DIAGNOSIS — I44.2 ATRIOVENTRICULAR BLOCK, THIRD DEGREE (HCC): ICD-10-CM

## 2024-03-25 DIAGNOSIS — Z95.0 PACEMAKER: ICD-10-CM

## 2024-03-25 LAB
ANION GAP SERPL CALC-SCNC: 2 MMOL/L (ref 5–15)
BUN SERPL-MCNC: 22 MG/DL (ref 6–20)
BUN/CREAT SERPL: 21 (ref 12–20)
CALCIUM SERPL-MCNC: 9.2 MG/DL (ref 8.5–10.1)
CHLORIDE SERPL-SCNC: 107 MMOL/L (ref 97–108)
CO2 SERPL-SCNC: 33 MMOL/L (ref 21–32)
CREAT SERPL-MCNC: 1.06 MG/DL (ref 0.7–1.3)
GLUCOSE SERPL-MCNC: 120 MG/DL (ref 65–100)
POTASSIUM SERPL-SCNC: 4.9 MMOL/L (ref 3.5–5.1)
SODIUM SERPL-SCNC: 142 MMOL/L (ref 136–145)

## 2024-03-26 ENCOUNTER — TELEPHONE (OUTPATIENT)
Age: 89
End: 2024-03-26

## 2024-03-26 NOTE — TELEPHONE ENCOUNTER
----- Message from MOISÉS Castellon NP sent at 3/26/2024  7:35 AM EDT -----  Cr WNL, BUN marginally elevated.  No change in treatment plan based on results.      Called patient, spoke with patient's son Eddie. Patient ID verified using two patient identifiers. Informed him of above lab results and recommendations per NATALYA Lomas NP. Eddie request a copy of lab results be mailed to patient's home address. Lab results  mailed as requested. Opportunities for questions, clarifications, and concerns provided.Eddie expressed understanding of all information.

## 2024-08-16 RX ORDER — LISINOPRIL 2.5 MG/1
2.5 TABLET ORAL DAILY
Qty: 30 TABLET | Refills: 3 | Status: SHIPPED | OUTPATIENT
Start: 2024-08-16

## 2024-08-16 RX ORDER — METOPROLOL SUCCINATE 25 MG/1
25 TABLET, EXTENDED RELEASE ORAL DAILY
Qty: 30 TABLET | Refills: 3 | Status: SHIPPED | OUTPATIENT
Start: 2024-08-16

## 2024-08-16 NOTE — TELEPHONE ENCOUNTER
Requested Prescriptions     Signed Prescriptions Disp Refills    lisinopril (PRINIVIL;ZESTRIL) 2.5 MG tablet 30 tablet 3     Sig: Take 1 tablet by mouth once daily     Authorizing Provider: AMAN MEYERS     Ordering User: SUKHJINDER NGUYEN    metoprolol succinate (TOPROL XL) 25 MG extended release tablet 30 tablet 3     Sig: Take 1 tablet by mouth once daily     Authorizing Provider: AMAN MEYERS     Ordering User: SUKHJINDER NGUYEN     Refills per verbal order from NATALYA Meyers NP.   Last visit: 2/27/24  Next visit: 2/19/25

## 2024-08-23 ENCOUNTER — TELEPHONE (OUTPATIENT)
Age: 89
End: 2024-08-23

## 2024-08-23 NOTE — TELEPHONE ENCOUNTER
Received call from patient's, ID verified using two patient identifiers.  Patient's son is calling because his father's bedside monitor is showing a magnifying glass with a light on.  He is not there with his parents to see exactly what is going on but his mother is concerned.    Suggested they try unplugging the monitor and plugging it back in to see if it just needs to be reset.    Provided with the number to [employer] customer service for further evaluation if that didn't work.    Son Sushant attempted to call his parents after placing our call on hold.  Line disconnected.

## 2024-08-26 PROCEDURE — 93294 REM INTERROG EVL PM/LDLS PM: CPT | Performed by: INTERNAL MEDICINE

## 2024-11-25 PROCEDURE — 93294 REM INTERROG EVL PM/LDLS PM: CPT | Performed by: INTERNAL MEDICINE

## 2024-12-17 RX ORDER — LISINOPRIL 2.5 MG/1
2.5 TABLET ORAL DAILY
Qty: 30 TABLET | Refills: 5 | Status: SHIPPED | OUTPATIENT
Start: 2024-12-17

## 2024-12-17 RX ORDER — METOPROLOL SUCCINATE 25 MG/1
25 TABLET, EXTENDED RELEASE ORAL DAILY
Qty: 30 TABLET | Refills: 5 | Status: SHIPPED | OUTPATIENT
Start: 2024-12-17

## 2024-12-17 NOTE — TELEPHONE ENCOUNTER
VO per NP    Future Appointments   Date Time Provider Department Center   2/19/2025  1:00 PM PACEMAKER, [facility] AM[facility]   2/19/2025  1:20 PM Rose Mary Sharma MD [facility] AMB

## 2025-02-17 ENCOUNTER — TELEPHONE (OUTPATIENT)
Age: 89
End: 2025-02-17

## 2025-02-17 NOTE — TELEPHONE ENCOUNTER
Patient's son Sushant is trying to find out if his father can have a physical done with our practice so that his father can be able to enter into a Assisted Living Facility with his wife.Please advise if we are able to do this for him.    [phone] Sushant (son)

## 2025-02-17 NOTE — TELEPHONE ENCOUNTER
Returned patient call, ID verified using two patient identifiers.  Advised Mr. Sushant Zena that we don't typically do physicals but since he is coming to see Dr. Sharma on Wednesday he can bring the form and we can see if we can fill it out for him.    Sushant appreciative of the call.    Future Appointments   Date Time Provider Department Center   2/19/2025  1:00 PM PACEMAKER, [facility] AM[facility]   2/19/2025  1:20 PM Rose Mary Sharma MD [facility] AMB

## 2025-02-28 ENCOUNTER — TELEPHONE (OUTPATIENT)
Age: 89
End: 2025-02-28

## 2025-02-28 NOTE — TELEPHONE ENCOUNTER
Patient son called back about this, he asked for fathers medication orders to be sent to fax please, for lisinopril and metoprolol and sons contact # is [phone] to notify him once resolved since he needs this complete today so his father can have his medications on tomorrow               Assistant living with patient and family's permission     [facility]      fax  [phone]

## 2025-02-28 NOTE — TELEPHONE ENCOUNTER
Assistant living with patient and family's permission    [facility]     fax  [phone]

## 2025-03-03 RX ORDER — LISINOPRIL 2.5 MG/1
2.5 TABLET ORAL DAILY
Qty: 90 TABLET | Refills: 3 | Status: SHIPPED | OUTPATIENT
Start: 2025-03-03

## 2025-03-03 RX ORDER — METOPROLOL SUCCINATE 25 MG/1
25 TABLET, EXTENDED RELEASE ORAL DAILY
Qty: 90 TABLET | Refills: 3 | OUTPATIENT
Start: 2025-03-03 | End: 2025-03-03 | Stop reason: SDUPTHER

## 2025-03-03 RX ORDER — LISINOPRIL 2.5 MG/1
2.5 TABLET ORAL DAILY
Qty: 90 TABLET | Refills: 3 | OUTPATIENT
Start: 2025-03-03 | End: 2025-03-03 | Stop reason: SDUPTHER

## 2025-03-03 RX ORDER — METOPROLOL SUCCINATE 25 MG/1
25 TABLET, EXTENDED RELEASE ORAL DAILY
Qty: 90 TABLET | Refills: 3 | Status: SHIPPED | OUTPATIENT
Start: 2025-03-03

## 2025-03-03 NOTE — TELEPHONE ENCOUNTER
Returned patient's son's call, ID verified using two patient identifiers.  Clarified that he needs a paper scripts faxed to [facility] [phone].    Hard scripts faxed to number above and confirmation received.    Future Appointments   Date Time Provider Department Center   3/19/2025  2:40 PM PACEMAKER, [facility] OS[facility] AM[facility]   3/19/2025  3:40 PM Rose Mary Sharma MD [facility] AM[facility]

## 2025-04-18 ENCOUNTER — PROCEDURE VISIT (OUTPATIENT)
Age: 89
End: 2025-04-18
Payer: MEDICARE

## 2025-04-18 ENCOUNTER — OFFICE VISIT (OUTPATIENT)
Age: 89
End: 2025-04-18
Payer: MEDICARE

## 2025-04-18 VITALS
DIASTOLIC BLOOD PRESSURE: 60 MMHG | SYSTOLIC BLOOD PRESSURE: 128 MMHG | HEART RATE: 64 BPM | WEIGHT: 115.2 LBS | RESPIRATION RATE: 16 BRPM | BODY MASS INDEX: 17.46 KG/M2 | HEIGHT: 68 IN | OXYGEN SATURATION: 98 %

## 2025-04-18 DIAGNOSIS — I45.10 RIGHT BUNDLE BRANCH BLOCK: ICD-10-CM

## 2025-04-18 DIAGNOSIS — Z95.0 PRESENCE OF CARDIAC PACEMAKER: Primary | ICD-10-CM

## 2025-04-18 DIAGNOSIS — I10 PRIMARY HYPERTENSION: ICD-10-CM

## 2025-04-18 DIAGNOSIS — I42.9 CARDIOMYOPATHY, UNSPECIFIED TYPE (HCC): ICD-10-CM

## 2025-04-18 DIAGNOSIS — R00.1 SYMPTOMATIC BRADYCARDIA: ICD-10-CM

## 2025-04-18 DIAGNOSIS — Z95.0 PACEMAKER: Primary | ICD-10-CM

## 2025-04-18 PROCEDURE — G8427 DOCREV CUR MEDS BY ELIG CLIN: HCPCS | Performed by: NURSE PRACTITIONER

## 2025-04-18 PROCEDURE — 1159F MED LIST DOCD IN RCRD: CPT | Performed by: NURSE PRACTITIONER

## 2025-04-18 PROCEDURE — 99214 OFFICE O/P EST MOD 30 MIN: CPT | Performed by: NURSE PRACTITIONER

## 2025-04-18 PROCEDURE — G8419 CALC BMI OUT NRM PARAM NOF/U: HCPCS | Performed by: NURSE PRACTITIONER

## 2025-04-18 PROCEDURE — 1126F AMNT PAIN NOTED NONE PRSNT: CPT | Performed by: NURSE PRACTITIONER

## 2025-04-18 PROCEDURE — 1160F RVW MEDS BY RX/DR IN RCRD: CPT | Performed by: NURSE PRACTITIONER

## 2025-04-18 PROCEDURE — 1036F TOBACCO NON-USER: CPT | Performed by: NURSE PRACTITIONER

## 2025-04-18 PROCEDURE — 1123F ACP DISCUSS/DSCN MKR DOCD: CPT | Performed by: NURSE PRACTITIONER

## 2025-04-18 PROCEDURE — 93280 PM DEVICE PROGR EVAL DUAL: CPT | Performed by: INTERNAL MEDICINE

## 2025-04-18 NOTE — PATIENT INSTRUCTIONS
Please send manual transmission on the following dates;     6-1-25  9-1-25  12-1-25  3-1-26  6-1-26

## 2025-05-12 RX ORDER — LISINOPRIL 2.5 MG/1
2.5 TABLET ORAL DAILY
Qty: 30 TABLET | Refills: 0 | Status: SHIPPED | OUTPATIENT
Start: 2025-05-12

## 2025-05-12 RX ORDER — METOPROLOL SUCCINATE 25 MG/1
25 TABLET, EXTENDED RELEASE ORAL DAILY
Qty: 30 TABLET | Refills: 0 | Status: SHIPPED | OUTPATIENT
Start: 2025-05-12

## 2025-05-12 NOTE — TELEPHONE ENCOUNTER
Ordered Per  Verbal Order.     Looks like Patient was given a Printed RX on 03/03/2025   Should have refills   Will ok 30 day just incase they need it local     Last appt: Visit date not found   Future Appointments   Date Time Provider Department Center   5/13/2026  1:00 PM PACEMAKER, [facility] NOEL   5/13/2026  1:20 PM Rose Mary Sharma MD [facility] AMB       Requested Prescriptions     Pending Prescriptions Disp Refills    lisinopril (PRINIVIL;ZESTRIL) 2.5 MG tablet [Pharmacy Med Name: Lisinopril 2.5 MG Oral Tablet] 30 tablet 0     Sig: Take 1 tablet by mouth once daily    metoprolol succinate (TOPROL XL) 25 MG extended release tablet [Pharmacy Med Name: Metoprolol Succinate ER 25 MG Oral Tablet Extended Release 24 Hour] 30 tablet 0     Sig: Take 1 tablet by mouth once daily         Prior labs and Blood pressures:  BP Readings from Last 3 Encounters:   04/18/25 128/60   02/22/24 122/70   02/01/24 128/60     Lab Results   Component Value Date/Time     03/25/2024 01:28 PM    K 4.9 03/25/2024 01:28 PM     03/25/2024 01:28 PM    CO2 33 03/25/2024 01:28 PM    BUN 22 03/25/2024 01:28 PM

## 2025-06-16 ENCOUNTER — TELEPHONE (OUTPATIENT)
Age: 89
End: 2025-06-16

## 2025-06-16 NOTE — TELEPHONE ENCOUNTER
Called pt and left vm to send us remote transmission. Gave [employer] phone number for any help troubleshooting.

## 2025-06-25 RX ORDER — METOPROLOL SUCCINATE 25 MG/1
25 TABLET, EXTENDED RELEASE ORAL DAILY
Qty: 30 TABLET | Refills: 5 | Status: SHIPPED | OUTPATIENT
Start: 2025-06-25

## 2025-06-25 RX ORDER — LISINOPRIL 2.5 MG/1
2.5 TABLET ORAL DAILY
Qty: 30 TABLET | Refills: 5 | Status: SHIPPED | OUTPATIENT
Start: 2025-06-25

## 2025-06-25 NOTE — TELEPHONE ENCOUNTER
Ordered Per  Verbal Order.     Last appt: 4/18/2025   Future Appointments   Date Time Provider Department Center   5/13/2026  1:00 PM PACEMAKER, [facility] AMB   5/13/2026  1:20 PM Rose Mary Sharma MD [facility] AMB       Requested Prescriptions     Pending Prescriptions Disp Refills    lisinopril (PRINIVIL;ZESTRIL) 2.5 MG tablet 30 tablet 0     Sig: Take 1 tablet by mouth daily    metoprolol succinate (TOPROL XL) 25 MG extended release tablet 30 tablet 0     Sig: Take 1 tablet by mouth daily         Prior labs and Blood pressures:  BP Readings from Last 3 Encounters:   04/18/25 128/60   02/22/24 122/70   02/01/24 128/60     Lab Results   Component Value Date/Time     03/25/2024 01:28 PM    K 4.9 03/25/2024 01:28 PM     03/25/2024 01:28 PM    CO2 33 03/25/2024 01:28 PM    BUN 22 03/25/2024 01:28 PM

## 2025-07-14 PROCEDURE — 93294 REM INTERROG EVL PM/LDLS PM: CPT | Performed by: INTERNAL MEDICINE

## 2025-07-25 RX ORDER — METOPROLOL SUCCINATE 25 MG/1
25 TABLET, EXTENDED RELEASE ORAL DAILY
Qty: 30 TABLET | Refills: 0 | OUTPATIENT
Start: 2025-07-25

## 2025-07-25 RX ORDER — LISINOPRIL 2.5 MG/1
2.5 TABLET ORAL DAILY
Qty: 30 TABLET | Refills: 0 | OUTPATIENT
Start: 2025-07-25